# Patient Record
Sex: FEMALE | Race: WHITE | Employment: OTHER | URBAN - NONMETROPOLITAN AREA
[De-identification: names, ages, dates, MRNs, and addresses within clinical notes are randomized per-mention and may not be internally consistent; named-entity substitution may affect disease eponyms.]

---

## 2017-04-28 DIAGNOSIS — E03.4 HYPOTHYROIDISM DUE TO ACQUIRED ATROPHY OF THYROID: Primary | ICD-10-CM

## 2017-04-28 RX ORDER — LEVOTHYROXINE SODIUM 0.1 MG/1
100 TABLET ORAL DAILY
Qty: 30 TABLET | Refills: 0 | Status: SHIPPED | OUTPATIENT
Start: 2017-04-28 | End: 2017-06-01 | Stop reason: SDUPTHER

## 2017-06-01 ENCOUNTER — OFFICE VISIT (OUTPATIENT)
Dept: FAMILY MEDICINE CLINIC | Age: 72
End: 2017-06-01

## 2017-06-01 VITALS
SYSTOLIC BLOOD PRESSURE: 136 MMHG | WEIGHT: 147.6 LBS | HEIGHT: 63 IN | RESPIRATION RATE: 12 BRPM | DIASTOLIC BLOOD PRESSURE: 80 MMHG | HEART RATE: 92 BPM | BODY MASS INDEX: 26.15 KG/M2

## 2017-06-01 DIAGNOSIS — E03.4 HYPOTHYROIDISM DUE TO ACQUIRED ATROPHY OF THYROID: Primary | ICD-10-CM

## 2017-06-01 DIAGNOSIS — R53.83 FATIGUE, UNSPECIFIED TYPE: ICD-10-CM

## 2017-06-01 DIAGNOSIS — Z12.39 SCREENING FOR BREAST CANCER: ICD-10-CM

## 2017-06-01 DIAGNOSIS — E66.3 OVERWEIGHT (BMI 25.0-29.9): ICD-10-CM

## 2017-06-01 PROCEDURE — G8427 DOCREV CUR MEDS BY ELIG CLIN: HCPCS | Performed by: FAMILY MEDICINE

## 2017-06-01 PROCEDURE — G8420 CALC BMI NORM PARAMETERS: HCPCS | Performed by: FAMILY MEDICINE

## 2017-06-01 PROCEDURE — 1036F TOBACCO NON-USER: CPT | Performed by: FAMILY MEDICINE

## 2017-06-01 PROCEDURE — 1123F ACP DISCUSS/DSCN MKR DOCD: CPT | Performed by: FAMILY MEDICINE

## 2017-06-01 PROCEDURE — G8400 PT W/DXA NO RESULTS DOC: HCPCS | Performed by: FAMILY MEDICINE

## 2017-06-01 PROCEDURE — 99214 OFFICE O/P EST MOD 30 MIN: CPT | Performed by: FAMILY MEDICINE

## 2017-06-01 PROCEDURE — 3014F SCREEN MAMMO DOC REV: CPT | Performed by: FAMILY MEDICINE

## 2017-06-01 PROCEDURE — 1090F PRES/ABSN URINE INCON ASSESS: CPT | Performed by: FAMILY MEDICINE

## 2017-06-01 PROCEDURE — 4040F PNEUMOC VAC/ADMIN/RCVD: CPT | Performed by: FAMILY MEDICINE

## 2017-06-01 PROCEDURE — 3017F COLORECTAL CA SCREEN DOC REV: CPT | Performed by: FAMILY MEDICINE

## 2017-06-01 RX ORDER — LEVOTHYROXINE SODIUM 0.1 MG/1
100 TABLET ORAL DAILY
Qty: 90 TABLET | Refills: 3 | Status: SHIPPED | OUTPATIENT
Start: 2017-06-01 | End: 2018-07-17 | Stop reason: SDUPTHER

## 2017-06-01 ASSESSMENT — ENCOUNTER SYMPTOMS
CONSTIPATION: 0
SHORTNESS OF BREATH: 0
WHEEZING: 0
NAUSEA: 0
SORE THROAT: 0
COUGH: 0
CHEST TIGHTNESS: 0
EYE PAIN: 0
VOMITING: 0
DIARRHEA: 0
RHINORRHEA: 0
ABDOMINAL PAIN: 0
BLOOD IN STOOL: 0
BACK PAIN: 0

## 2017-06-01 ASSESSMENT — PATIENT HEALTH QUESTIONNAIRE - PHQ9
2. FEELING DOWN, DEPRESSED OR HOPELESS: 1
1. LITTLE INTEREST OR PLEASURE IN DOING THINGS: 0
SUM OF ALL RESPONSES TO PHQ QUESTIONS 1-9: 1
SUM OF ALL RESPONSES TO PHQ9 QUESTIONS 1 & 2: 1

## 2017-06-02 ENCOUNTER — TELEPHONE (OUTPATIENT)
Dept: FAMILY MEDICINE CLINIC | Age: 72
End: 2017-06-02

## 2017-06-02 LAB
ABSOLUTE BASO #: 0.1 K/UL (ref 0–0.1)
ABSOLUTE EOS #: 0.2 K/UL (ref 0.1–0.4)
ABSOLUTE LYMPH #: 1.7 K/UL (ref 0.8–5.2)
ABSOLUTE MONO #: 0.4 K/UL (ref 0.1–0.9)
ABSOLUTE NEUT #: 3.3 K/UL (ref 1.3–9.1)
BASOPHILS ABSOLUTE: NORMAL /ΜL
BASOPHILS RELATIVE PERCENT: 1.4 %
BASOPHILS RELATIVE PERCENT: NORMAL %
CHOLESTEROL, TOTAL: 232 MG/DL
CHOLESTEROL/HDL RATIO: 2.7
CHOLESTEROL/HDL RATIO: 2.7
CHOLESTEROL: 232 MG/DL
EOSINOPHILS ABSOLUTE: NORMAL /ΜL
EOSINOPHILS RELATIVE PERCENT: 2.7 %
EOSINOPHILS RELATIVE PERCENT: NORMAL %
GLUCOSE: 90 MG/DL (ref 70–100)
HCT VFR BLD CALC: 40.2 % (ref 36–48)
HCT VFR BLD CALC: NORMAL % (ref 36–46)
HDLC SERPL-MCNC: 85 MG/DL (ref 35–70)
HDLC SERPL-MCNC: 85 MG/DL (ref 40–60)
HEMOGLOBIN: 13.8 G/DL (ref 12–16)
HEMOGLOBIN: NORMAL G/DL (ref 12–16)
LDL CHOLESTEROL CALCULATED: 131 MG/DL
LDL CHOLESTEROL CALCULATED: 131 MG/DL (ref 0–160)
LDL/HDL RATIO: 1.5
LYMPHOCYTE %: 30.2 %
LYMPHOCYTES ABSOLUTE: NORMAL /ΜL
LYMPHOCYTES RELATIVE PERCENT: NORMAL %
MCH RBC QN AUTO: 31.2 PG (ref 27–34)
MCH RBC QN AUTO: NORMAL PG
MCHC RBC AUTO-ENTMCNC: 34.3 G/DL (ref 31–36)
MCHC RBC AUTO-ENTMCNC: NORMAL G/DL
MCV RBC AUTO: 90.7 FL (ref 80–100)
MCV RBC AUTO: NORMAL FL
MONOCYTES # BLD: 6.2 %
MONOCYTES ABSOLUTE: NORMAL /ΜL
MONOCYTES RELATIVE PERCENT: NORMAL %
NEUTROPHILS ABSOLUTE: NORMAL /ΜL
NEUTROPHILS RELATIVE PERCENT: 59.1 %
NEUTROPHILS RELATIVE PERCENT: NORMAL %
PDW BLD-RTO: 12.3 % (ref 10.8–14.8)
PLATELET # BLD: NORMAL K/ΜL
PLATELETS: 237 K/UL (ref 150–450)
PMV BLD AUTO: NORMAL FL
RBC # BLD: NORMAL 10^6/ΜL
RBC: 4.43 M/UL (ref 4–5.5)
T4 FREE: 1.67
T4 FREE: 1.67 NG/DL (ref 0.8–1.8)
TRIGL SERPL-MCNC: 82 MG/DL
TRIGL SERPL-MCNC: 82 MG/DL
TSH SERPL DL<=0.05 MIU/L-ACNC: 0.64 UIU/ML
TSH SERPL DL<=0.05 MIU/L-ACNC: 0.64 UIU/ML (ref 0.4–4.4)
VLDLC SERPL CALC-MCNC: 16 MG/DL
VLDLC SERPL CALC-MCNC: 16 MG/DL
WBC # BLD: NORMAL 10^3/ML
WBC: 5.6 K/UL (ref 3.7–10.8)

## 2017-11-08 ENCOUNTER — OFFICE VISIT (OUTPATIENT)
Dept: FAMILY MEDICINE CLINIC | Age: 72
End: 2017-11-08
Payer: MEDICARE

## 2017-11-08 VITALS
BODY MASS INDEX: 26.46 KG/M2 | RESPIRATION RATE: 12 BRPM | DIASTOLIC BLOOD PRESSURE: 74 MMHG | HEART RATE: 72 BPM | WEIGHT: 147 LBS | SYSTOLIC BLOOD PRESSURE: 112 MMHG

## 2017-11-08 DIAGNOSIS — R30.0 DYSURIA: ICD-10-CM

## 2017-11-08 DIAGNOSIS — R10.31 RIGHT LOWER QUADRANT ABDOMINAL PAIN: ICD-10-CM

## 2017-11-08 DIAGNOSIS — N30.01 ACUTE CYSTITIS WITH HEMATURIA: Primary | ICD-10-CM

## 2017-11-08 LAB
BILIRUBIN, POC: NORMAL
BLOOD URINE, POC: NORMAL
CLARITY, POC: CLEAR
COLOR, POC: YELLOW
GLUCOSE URINE, POC: NORMAL
KETONES, POC: NORMAL
LEUKOCYTE EST, POC: NORMAL
NITRITE, POC: NORMAL
PH, POC: 5
PROTEIN, POC: NORMAL
SPECIFIC GRAVITY, POC: 1.02
UROBILINOGEN, POC: 0.2

## 2017-11-08 PROCEDURE — 3017F COLORECTAL CA SCREEN DOC REV: CPT | Performed by: FAMILY MEDICINE

## 2017-11-08 PROCEDURE — G8427 DOCREV CUR MEDS BY ELIG CLIN: HCPCS | Performed by: FAMILY MEDICINE

## 2017-11-08 PROCEDURE — 99213 OFFICE O/P EST LOW 20 MIN: CPT | Performed by: FAMILY MEDICINE

## 2017-11-08 PROCEDURE — G8417 CALC BMI ABV UP PARAM F/U: HCPCS | Performed by: FAMILY MEDICINE

## 2017-11-08 PROCEDURE — 4040F PNEUMOC VAC/ADMIN/RCVD: CPT | Performed by: FAMILY MEDICINE

## 2017-11-08 PROCEDURE — G8484 FLU IMMUNIZE NO ADMIN: HCPCS | Performed by: FAMILY MEDICINE

## 2017-11-08 PROCEDURE — 1123F ACP DISCUSS/DSCN MKR DOCD: CPT | Performed by: FAMILY MEDICINE

## 2017-11-08 PROCEDURE — 1036F TOBACCO NON-USER: CPT | Performed by: FAMILY MEDICINE

## 2017-11-08 PROCEDURE — 1090F PRES/ABSN URINE INCON ASSESS: CPT | Performed by: FAMILY MEDICINE

## 2017-11-08 PROCEDURE — 3014F SCREEN MAMMO DOC REV: CPT | Performed by: FAMILY MEDICINE

## 2017-11-08 PROCEDURE — 81003 URINALYSIS AUTO W/O SCOPE: CPT | Performed by: FAMILY MEDICINE

## 2017-11-08 PROCEDURE — G8400 PT W/DXA NO RESULTS DOC: HCPCS | Performed by: FAMILY MEDICINE

## 2017-11-08 RX ORDER — SULFAMETHOXAZOLE AND TRIMETHOPRIM 800; 160 MG/1; MG/1
1 TABLET ORAL 2 TIMES DAILY
Qty: 20 TABLET | Refills: 0 | Status: SHIPPED | OUTPATIENT
Start: 2017-11-08 | End: 2017-11-18

## 2017-11-08 ASSESSMENT — ENCOUNTER SYMPTOMS
CONSTIPATION: 0
BACK PAIN: 0
WHEEZING: 0
SHORTNESS OF BREATH: 0
CHEST TIGHTNESS: 0
VOMITING: 0
DIARRHEA: 0
RHINORRHEA: 0
EYE PAIN: 0
SORE THROAT: 0
NAUSEA: 0
ABDOMINAL PAIN: 1
BLOOD IN STOOL: 0
COUGH: 0

## 2017-11-08 NOTE — PROGRESS NOTES
Subjective:      Patient ID: Lucas Ayoub is a 67 y.o. female. HPI  Pt here for 2 days of dysuria. Urine is positive. No fever or chills. Bactrim works well. Has area on her tongue to look at and abdominal pain from old appy scar. , nonsmoker,pmh reviewed. Reviewed BMI of 26,  Review of Systems   Constitutional: Negative for chills, fatigue, fever and unexpected weight change. HENT: Positive for mouth sores. Negative for congestion, ear pain, rhinorrhea and sore throat. Eyes: Negative for pain and visual disturbance. Respiratory: Negative for cough, chest tightness, shortness of breath and wheezing. Cardiovascular: Negative for chest pain and palpitations. Gastrointestinal: Positive for abdominal pain. Negative for blood in stool, constipation, diarrhea, nausea and vomiting. Genitourinary: Positive for dysuria. Negative for difficulty urinating, frequency, hematuria and urgency. Musculoskeletal: Negative for back pain, joint swelling, myalgias and neck pain. Skin: Negative for rash. Neurological: Negative for dizziness and headaches. Hematological: Negative for adenopathy. Does not bruise/bleed easily. Psychiatric/Behavioral: Negative for behavioral problems and sleep disturbance. The patient is not nervous/anxious. Objective:   Physical Exam   Constitutional: She is oriented to person, place, and time. She appears well-developed and well-nourished. HENT:   Head: Normocephalic and atraumatic. Right Ear: External ear normal.   Left Ear: External ear normal.   Nose: Nose normal.   Mouth/Throat: Oropharynx is clear and moist.   Eyes: EOM are normal. Pupils are equal, round, and reactive to light. Neck: Neck supple. No thyromegaly present. Cardiovascular: Normal rate, regular rhythm and normal heart sounds. Pulmonary/Chest: Breath sounds normal. She has no wheezes. She has no rales. Abdominal: Soft. Bowel sounds are normal. There is no tenderness.  There is

## 2017-11-08 NOTE — PATIENT INSTRUCTIONS
or treatment. The doctor has checked you carefully, but problems can develop later. If you notice any problems or new symptoms, get medical treatment right away. Follow-up care is a key part of your treatment and safety. Be sure to make and go to all appointments, and call your doctor if you are having problems. It's also a good idea to know your test results and keep a list of the medicines you take. How can you care for yourself at home? · Rest until you feel better. · To prevent dehydration, drink plenty of fluids, enough so that your urine is light yellow or clear like water. Choose water and other caffeine-free clear liquids until you feel better. If you have kidney, heart, or liver disease and have to limit fluids, talk with your doctor before you increase the amount of fluids you drink. · If your stomach is upset, eat mild foods, such as rice, dry toast or crackers, bananas, and applesauce. Try eating several small meals instead of two or three large ones. · Wait until 48 hours after all symptoms have gone away before you have spicy foods, alcohol, and drinks that contain caffeine. · Do not eat foods that are high in fat. · Avoid anti-inflammatory medicines such as aspirin, ibuprofen (Advil, Motrin), and naproxen (Aleve). These can cause stomach upset. Talk to your doctor if you take daily aspirin for another health problem. When should you call for help? Call 911 anytime you think you may need emergency care. For example, call if:  · You passed out (lost consciousness). · You pass maroon or very bloody stools. · You vomit blood or what looks like coffee grounds. · You have new, severe belly pain. Call your doctor now or seek immediate medical care if:  · Your pain gets worse, especially if it becomes focused in one area of your belly. · You have a new or higher fever. · Your stools are black and look like tar, or they have streaks of blood. · You have unexpected vaginal bleeding.   · You have symptoms of a urinary tract infection. These may include:  ¨ Pain when you urinate. ¨ Urinating more often than usual.  ¨ Blood in your urine. · You are dizzy or lightheaded, or you feel like you may faint. Watch closely for changes in your health, and be sure to contact your doctor if:  · You are not getting better after 1 day (24 hours). Where can you learn more? Go to https://[employer].ALOHA. org and sign in to your Ask Ziggy account. Enter M325 in the Fashism box to learn more about \"Abdominal Pain: Care Instructions. \"     If you do not have an account, please click on the \"Sign Up Now\" link. Current as of: March 20, 2017  Content Version: 11.3  © 1313-9556 [employer]. Care instructions adapted under license by [facility] ([facility]). If you have questions about a medical condition or this instruction, always ask your healthcare professional. [place] [address] any warranty or liability for your use of this information.

## 2017-11-10 ENCOUNTER — TELEPHONE (OUTPATIENT)
Dept: FAMILY MEDICINE CLINIC | Age: 72
End: 2017-11-10

## 2017-11-10 LAB — URINE CULTURE, ROUTINE: NORMAL

## 2018-03-16 ENCOUNTER — OFFICE VISIT (OUTPATIENT)
Dept: FAMILY MEDICINE CLINIC | Age: 73
End: 2018-03-16
Payer: MEDICARE

## 2018-03-16 VITALS
WEIGHT: 146.4 LBS | HEART RATE: 74 BPM | TEMPERATURE: 98 F | DIASTOLIC BLOOD PRESSURE: 84 MMHG | SYSTOLIC BLOOD PRESSURE: 130 MMHG | OXYGEN SATURATION: 97 % | BODY MASS INDEX: 25.94 KG/M2 | RESPIRATION RATE: 12 BRPM | HEIGHT: 63 IN

## 2018-03-16 DIAGNOSIS — H66.001 ACUTE SUPPURATIVE OTITIS MEDIA OF RIGHT EAR WITHOUT SPONTANEOUS RUPTURE OF TYMPANIC MEMBRANE, RECURRENCE NOT SPECIFIED: Primary | ICD-10-CM

## 2018-03-16 DIAGNOSIS — Z12.39 SCREENING FOR BREAST CANCER: ICD-10-CM

## 2018-03-16 PROCEDURE — 1090F PRES/ABSN URINE INCON ASSESS: CPT | Performed by: FAMILY MEDICINE

## 2018-03-16 PROCEDURE — 3014F SCREEN MAMMO DOC REV: CPT | Performed by: FAMILY MEDICINE

## 2018-03-16 PROCEDURE — 4040F PNEUMOC VAC/ADMIN/RCVD: CPT | Performed by: FAMILY MEDICINE

## 2018-03-16 PROCEDURE — G8427 DOCREV CUR MEDS BY ELIG CLIN: HCPCS | Performed by: FAMILY MEDICINE

## 2018-03-16 PROCEDURE — G8484 FLU IMMUNIZE NO ADMIN: HCPCS | Performed by: FAMILY MEDICINE

## 2018-03-16 PROCEDURE — G8400 PT W/DXA NO RESULTS DOC: HCPCS | Performed by: FAMILY MEDICINE

## 2018-03-16 PROCEDURE — G8417 CALC BMI ABV UP PARAM F/U: HCPCS | Performed by: FAMILY MEDICINE

## 2018-03-16 PROCEDURE — 1123F ACP DISCUSS/DSCN MKR DOCD: CPT | Performed by: FAMILY MEDICINE

## 2018-03-16 PROCEDURE — 1036F TOBACCO NON-USER: CPT | Performed by: FAMILY MEDICINE

## 2018-03-16 PROCEDURE — 99213 OFFICE O/P EST LOW 20 MIN: CPT | Performed by: FAMILY MEDICINE

## 2018-03-16 PROCEDURE — 3017F COLORECTAL CA SCREEN DOC REV: CPT | Performed by: FAMILY MEDICINE

## 2018-03-16 RX ORDER — SULFAMETHOXAZOLE AND TRIMETHOPRIM 800; 160 MG/1; MG/1
1 TABLET ORAL 2 TIMES DAILY
Qty: 20 TABLET | Refills: 0 | Status: SHIPPED | OUTPATIENT
Start: 2018-03-16 | End: 2018-03-26

## 2018-03-16 ASSESSMENT — ENCOUNTER SYMPTOMS
BLOOD IN STOOL: 0
WHEEZING: 0
EYE PAIN: 0
CHEST TIGHTNESS: 0
DIARRHEA: 0
RHINORRHEA: 0
ABDOMINAL PAIN: 0
SHORTNESS OF BREATH: 0
BACK PAIN: 0
CONSTIPATION: 0
VOMITING: 0
SORE THROAT: 0
NAUSEA: 0
COUGH: 0

## 2018-07-17 ENCOUNTER — OFFICE VISIT (OUTPATIENT)
Dept: FAMILY MEDICINE CLINIC | Age: 73
End: 2018-07-17
Payer: MEDICARE

## 2018-07-17 VITALS
WEIGHT: 148.2 LBS | SYSTOLIC BLOOD PRESSURE: 112 MMHG | BODY MASS INDEX: 27.27 KG/M2 | HEART RATE: 74 BPM | HEIGHT: 62 IN | DIASTOLIC BLOOD PRESSURE: 80 MMHG | RESPIRATION RATE: 14 BRPM

## 2018-07-17 DIAGNOSIS — E03.4 HYPOTHYROIDISM DUE TO ACQUIRED ATROPHY OF THYROID: Primary | ICD-10-CM

## 2018-07-17 DIAGNOSIS — Z12.39 SCREENING FOR BREAST CANCER: ICD-10-CM

## 2018-07-17 DIAGNOSIS — E66.3 OVERWEIGHT (BMI 25.0-29.9): ICD-10-CM

## 2018-07-17 DIAGNOSIS — E78.00 HYPERCHOLESTEROLEMIA: ICD-10-CM

## 2018-07-17 LAB
ALBUMIN SERPL-MCNC: 4.5 G/DL (ref 3.5–5.1)
ALP BLD-CCNC: 51 U/L (ref 38–126)
ALT SERPL-CCNC: 13 U/L (ref 11–66)
ANION GAP SERPL CALCULATED.3IONS-SCNC: 13 MEQ/L (ref 8–16)
AST SERPL-CCNC: 18 U/L (ref 5–40)
BASOPHILS # BLD: 1.1 %
BASOPHILS ABSOLUTE: 0.1 THOU/MM3 (ref 0–0.1)
BILIRUB SERPL-MCNC: 0.6 MG/DL (ref 0.3–1.2)
BUN BLDV-MCNC: 13 MG/DL (ref 7–22)
CALCIUM SERPL-MCNC: 9.8 MG/DL (ref 8.5–10.5)
CHLORIDE BLD-SCNC: 102 MEQ/L (ref 98–111)
CHOLESTEROL, TOTAL: 249 MG/DL (ref 100–199)
CO2: 26 MEQ/L (ref 23–33)
CREAT SERPL-MCNC: 0.6 MG/DL (ref 0.4–1.2)
EOSINOPHIL # BLD: 2.8 %
EOSINOPHILS ABSOLUTE: 0.2 THOU/MM3 (ref 0–0.4)
ERYTHROCYTE [DISTWIDTH] IN BLOOD BY AUTOMATED COUNT: 13.2 % (ref 11.5–14.5)
ERYTHROCYTE [DISTWIDTH] IN BLOOD BY AUTOMATED COUNT: 46.1 FL (ref 35–45)
GFR SERPL CREATININE-BSD FRML MDRD: > 90 ML/MIN/1.73M2
GLUCOSE BLD-MCNC: 92 MG/DL (ref 70–108)
HCT VFR BLD CALC: 40.7 % (ref 37–47)
HDLC SERPL-MCNC: 84 MG/DL
HEMOGLOBIN: 13.4 GM/DL (ref 12–16)
IMMATURE GRANS (ABS): 0.02 THOU/MM3 (ref 0–0.07)
IMMATURE GRANULOCYTES: 0.4 %
LDL CHOLESTEROL CALCULATED: 138 MG/DL
LYMPHOCYTES # BLD: 35.1 %
LYMPHOCYTES ABSOLUTE: 2 THOU/MM3 (ref 1–4.8)
MCH RBC QN AUTO: 31.3 PG (ref 26–33)
MCHC RBC AUTO-ENTMCNC: 32.9 GM/DL (ref 32.2–35.5)
MCV RBC AUTO: 95.1 FL (ref 81–99)
MONOCYTES # BLD: 6.9 %
MONOCYTES ABSOLUTE: 0.4 THOU/MM3 (ref 0.4–1.3)
NUCLEATED RED BLOOD CELLS: 0 /100 WBC
PLATELET # BLD: 233 THOU/MM3 (ref 130–400)
PMV BLD AUTO: 10.1 FL (ref 9.4–12.4)
POTASSIUM SERPL-SCNC: 4.2 MEQ/L (ref 3.5–5.2)
RBC # BLD: 4.28 MILL/MM3 (ref 4.2–5.4)
SEG NEUTROPHILS: 53.7 %
SEGMENTED NEUTROPHILS ABSOLUTE COUNT: 3.1 THOU/MM3 (ref 1.8–7.7)
SODIUM BLD-SCNC: 141 MEQ/L (ref 135–145)
T4 FREE: 1.29 NG/DL (ref 0.93–1.76)
TOTAL PROTEIN: 7.4 G/DL (ref 6.1–8)
TRIGL SERPL-MCNC: 133 MG/DL (ref 0–199)
TSH SERPL DL<=0.05 MIU/L-ACNC: 3.94 UIU/ML (ref 0.4–4.2)
WBC # BLD: 5.7 THOU/MM3 (ref 4.8–10.8)

## 2018-07-17 PROCEDURE — 1123F ACP DISCUSS/DSCN MKR DOCD: CPT | Performed by: FAMILY MEDICINE

## 2018-07-17 PROCEDURE — 1090F PRES/ABSN URINE INCON ASSESS: CPT | Performed by: FAMILY MEDICINE

## 2018-07-17 PROCEDURE — 1101F PT FALLS ASSESS-DOCD LE1/YR: CPT | Performed by: FAMILY MEDICINE

## 2018-07-17 PROCEDURE — 1036F TOBACCO NON-USER: CPT | Performed by: FAMILY MEDICINE

## 2018-07-17 PROCEDURE — G8400 PT W/DXA NO RESULTS DOC: HCPCS | Performed by: FAMILY MEDICINE

## 2018-07-17 PROCEDURE — G8417 CALC BMI ABV UP PARAM F/U: HCPCS | Performed by: FAMILY MEDICINE

## 2018-07-17 PROCEDURE — 4040F PNEUMOC VAC/ADMIN/RCVD: CPT | Performed by: FAMILY MEDICINE

## 2018-07-17 PROCEDURE — 99214 OFFICE O/P EST MOD 30 MIN: CPT | Performed by: FAMILY MEDICINE

## 2018-07-17 PROCEDURE — 36415 COLL VENOUS BLD VENIPUNCTURE: CPT | Performed by: FAMILY MEDICINE

## 2018-07-17 PROCEDURE — 3017F COLORECTAL CA SCREEN DOC REV: CPT | Performed by: FAMILY MEDICINE

## 2018-07-17 PROCEDURE — G8427 DOCREV CUR MEDS BY ELIG CLIN: HCPCS | Performed by: FAMILY MEDICINE

## 2018-07-17 RX ORDER — LEVOTHYROXINE SODIUM 0.1 MG/1
100 TABLET ORAL DAILY
Qty: 90 TABLET | Refills: 3 | Status: SHIPPED | OUTPATIENT
Start: 2018-07-17 | End: 2019-11-15 | Stop reason: SDUPTHER

## 2018-07-17 ASSESSMENT — PATIENT HEALTH QUESTIONNAIRE - PHQ9
2. FEELING DOWN, DEPRESSED OR HOPELESS: 0
SUM OF ALL RESPONSES TO PHQ QUESTIONS 1-9: 0
1. LITTLE INTEREST OR PLEASURE IN DOING THINGS: 0
SUM OF ALL RESPONSES TO PHQ9 QUESTIONS 1 & 2: 0

## 2018-07-17 ASSESSMENT — ENCOUNTER SYMPTOMS
SHORTNESS OF BREATH: 0
COUGH: 0
BACK PAIN: 0
VOMITING: 0
RHINORRHEA: 0
EYE PAIN: 0
SORE THROAT: 0
NAUSEA: 0
CONSTIPATION: 0
CHEST TIGHTNESS: 0
WHEEZING: 0
ABDOMINAL PAIN: 0
DIARRHEA: 0
BLOOD IN STOOL: 0

## 2018-07-17 NOTE — PATIENT INSTRUCTIONS
stroke. LDL and HDL are part of your total cholesterol. LDL is the \"bad\" cholesterol. High LDL can raise your risk for heart disease, heart attack, and stroke. HDL is the \"good\" cholesterol. It helps clear bad cholesterol from the body. High HDL is linked with a lower risk of heart disease, heart attack, and stroke. Your cholesterol levels help your doctor find out your risk for having a heart attack or stroke. You and your doctor can talk about whether you need to lower your risk and what treatment is best for you. A heart-healthy lifestyle along with medicines can help lower your cholesterol and your risk. The way you choose to lower your risk will depend on how high your risk is for heart attack and stroke. It will also depend on how you feel about taking medicines. Follow-up care is a key part of your treatment and safety. Be sure to make and go to all appointments, and call your doctor if you are having problems. It's also a good idea to know your test results and keep a list of the medicines you take. How can you care for yourself at home? · Eat a variety of foods every day. Good choices include fruits, vegetables, whole grains (like oatmeal), dried beans and peas, nuts and seeds, soy products (like tofu), and fat-free or low-fat dairy products. · Replace butter, margarine, and hydrogenated or partially hydrogenated oils with olive and canola oils. (Canola oil margarine without trans fat is fine.)  · Replace red meat with fish, poultry, and soy protein (like tofu). · Limit processed and packaged foods like chips, crackers, and cookies. · Bake, broil, or steam foods. Don't baltazar them. · Be physically active. Get at least 30 minutes of exercise on most days of the week. Walking is a good choice. You also may want to do other activities, such as running, swimming, cycling, or playing tennis or team sports.   · Stay at a healthy weight or lose weight by making the changes in eating and physical activity listed above. Losing just a small amount of weight, even 5 to 10 pounds, can reduce your risk for having a heart attack or stroke. · Do not smoke. When should you call for help? Watch closely for changes in your health, and be sure to contact your doctor if:    · You need help making lifestyle changes.     · You have questions about your medicine. Where can you learn more? Go to https://[employer]. org and sign in to your The Hitch account. Enter V427 in the Chairish box to learn more about \"High Cholesterol: Care Instructions. \"     If you do not have an account, please click on the \"Sign Up Now\" link. Current as of: May 10, 2017  Content Version: 11.6  © 4027-6713 [employer]. Care instructions adapted under license by [facility] ([facility]). If you have questions about a medical condition or this instruction, always ask your healthcare professional. [address] any warranty or liability for your use of this information. Patient Education        DASH Diet: Care Instructions  Your Care Instructions    The DASH diet is an eating plan that can help lower your blood pressure. DASH stands for Dietary Approaches to Stop Hypertension. Hypertension is high blood pressure. The DASH diet focuses on eating foods that are high in calcium, potassium, and magnesium. These nutrients can lower blood pressure. The foods that are highest in these nutrients are fruits, vegetables, low-fat dairy products, nuts, seeds, and legumes. But taking calcium, potassium, and magnesium supplements instead of eating foods that are high in those nutrients does not have the same effect. The DASH diet also includes whole grains, fish, and poultry. The DASH diet is one of several lifestyle changes your doctor may recommend to lower your high blood pressure. Your doctor may also want you to decrease the amount of sodium in your diet.  Lowering sodium while following the DASH diet can lower blood pressure even further than just the DASH diet alone. Follow-up care is a key part of your treatment and safety. Be sure to make and go to all appointments, and call your doctor if you are having problems. It's also a good idea to know your test results and keep a list of the medicines you take. How can you care for yourself at home? Following the DASH diet  · Eat 4 to 5 servings of fruit each day. A serving is 1 medium-sized piece of fruit, ½ cup chopped or canned fruit, 1/4 cup dried fruit, or 4 ounces (½ cup) of fruit juice. Choose fruit more often than fruit juice. · Eat 4 to 5 servings of vegetables each day. A serving is 1 cup of lettuce or raw leafy vegetables, ½ cup of chopped or cooked vegetables, or 4 ounces (½ cup) of vegetable juice. Choose vegetables more often than vegetable juice. · Get 2 to 3 servings of low-fat and fat-free dairy each day. A serving is 8 ounces of milk, 1 cup of yogurt, or 1 ½ ounces of cheese. · Eat 6 to 8 servings of grains each day. A serving is 1 slice of bread, 1 ounce of dry cereal, or ½ cup of cooked rice, pasta, or cooked cereal. Try to choose whole-grain products as much as possible. · Limit lean meat, poultry, and fish to 2 servings each day. A serving is 3 ounces, about the size of a deck of cards. · Eat 4 to 5 servings of nuts, seeds, and legumes (cooked dried beans, lentils, and split peas) each week. A serving is 1/3 cup of nuts, 2 tablespoons of seeds, or ½ cup of cooked beans or peas. · Limit fats and oils to 2 to 3 servings each day. A serving is 1 teaspoon of vegetable oil or 2 tablespoons of salad dressing. · Limit sweets and added sugars to 5 servings or less a week. A serving is 1 tablespoon jelly or jam, ½ cup sorbet, or 1 cup of lemonade. · Eat less than 2,300 milligrams (mg) of sodium a day. If you limit your sodium to 1,500 mg a day, you can lower your blood pressure even more. Tips for success  · Start small.  Do not try to make

## 2018-07-17 NOTE — PROGRESS NOTES
Musculoskeletal: Normal range of motion. She exhibits no edema. Lymphadenopathy:     She has no cervical adenopathy. Neurological: She is alert and oriented to person, place, and time. She has normal reflexes. No cranial nerve deficit. Skin: Skin is warm and dry. No rash noted. Psychiatric: She has a normal mood and affect. Nursing note and vitals reviewed. Health Maintenance   Topic Date Due    Hepatitis C screen  1945    DTaP/Tdap/Td vaccine (1 - Tdap) 09/18/1964    Shingles Vaccine (1 of 2 - 2 Dose Series) 09/18/1995    Pneumococcal low/med risk (1 of 2 - PCV13) 09/18/2010    A1C test (Diabetic or Prediabetic)  12/03/2013    Breast cancer screen  04/19/2018    Flu vaccine (1) 09/01/2018    TSH testing  12/18/2018    Colon cancer screen colonoscopy  06/10/2020    Lipid screen  06/01/2022    DEXA (modify frequency per FRAX score)  Addressed     Lab Results   Component Value Date    CHOL 232 (H) 06/01/2017    CHOL 232 06/01/2017    CHOL 224 (H) 06/17/2016     Lab Results   Component Value Date    TRIG 82 06/01/2017    TRIG 82 06/01/2017    TRIG 74 06/17/2016     Lab Results   Component Value Date    HDL 85 (H) 06/01/2017    HDL 85 (A) 06/01/2017    HDL 77 (H) 06/17/2016     Lab Results   Component Value Date    LDLCALC 131 (H) 06/01/2017    LDLCALC 131 06/01/2017    LDLCALC 132 (H) 06/17/2016     Lab Results   Component Value Date    LABVLDL 16 06/01/2017    LABVLDL 15 06/17/2016    VLDL 16 06/01/2017    VLDL 27 08/08/2014     Lab Results   Component Value Date    CHOLHDLRATIO 2.7 06/01/2017    CHOLHDLRATIO 2.7 06/01/2017    CHOLHDLRATIO 2.9 06/17/2016     Lab Results   Component Value Date    TSH 0.635 06/01/2017       Assessment:      Hypothyroidism  Hypercholesterolemia  Overweight      Plan:      Refill meds  Blood work  Encouraged diet, exercise and weight loss.   Dash diet  Reviewed health maintenance  Mammogram    Luciana Ramirez received counseling on the following healthy behaviors:

## 2018-07-18 ENCOUNTER — TELEPHONE (OUTPATIENT)
Dept: FAMILY MEDICINE CLINIC | Age: 73
End: 2018-07-18

## 2018-07-18 NOTE — TELEPHONE ENCOUNTER
----- Message from Vanna Loyd MD sent at 7/18/2018  7:17 AM EDT -----  Thyroids are good. Cholesterol at 249 but still normal CRR. ( 232 last year). CBC and CMP are good. Continue present.

## 2019-07-05 ENCOUNTER — TELEPHONE (OUTPATIENT)
Dept: FAMILY MEDICINE CLINIC | Age: 74
End: 2019-07-05

## 2019-11-13 ENCOUNTER — TELEPHONE (OUTPATIENT)
Dept: FAMILY MEDICINE CLINIC | Age: 74
End: 2019-11-13

## 2019-11-15 ENCOUNTER — OFFICE VISIT (OUTPATIENT)
Dept: FAMILY MEDICINE CLINIC | Age: 74
End: 2019-11-15
Payer: MEDICARE

## 2019-11-15 VITALS
SYSTOLIC BLOOD PRESSURE: 116 MMHG | TEMPERATURE: 97.5 F | RESPIRATION RATE: 12 BRPM | HEART RATE: 72 BPM | DIASTOLIC BLOOD PRESSURE: 68 MMHG

## 2019-11-15 DIAGNOSIS — E78.00 HYPERCHOLESTEROLEMIA: ICD-10-CM

## 2019-11-15 DIAGNOSIS — E03.4 HYPOTHYROIDISM DUE TO ACQUIRED ATROPHY OF THYROID: ICD-10-CM

## 2019-11-15 DIAGNOSIS — F32.9 REACTIVE DEPRESSION: ICD-10-CM

## 2019-11-15 DIAGNOSIS — K21.9 GASTROESOPHAGEAL REFLUX DISEASE, ESOPHAGITIS PRESENCE NOT SPECIFIED: ICD-10-CM

## 2019-11-15 DIAGNOSIS — I63.9 CEREBROVASCULAR ACCIDENT (CVA), UNSPECIFIED MECHANISM (HCC): Primary | ICD-10-CM

## 2019-11-15 DIAGNOSIS — H66.001 NON-RECURRENT ACUTE SUPPURATIVE OTITIS MEDIA OF RIGHT EAR WITHOUT SPONTANEOUS RUPTURE OF TYMPANIC MEMBRANE: ICD-10-CM

## 2019-11-15 PROBLEM — R29.898 RIGHT ARM WEAKNESS: Status: ACTIVE | Noted: 2019-07-07

## 2019-11-15 PROBLEM — R47.01 EXPRESSIVE APHASIA: Status: ACTIVE | Noted: 2019-07-07

## 2019-11-15 PROBLEM — I63.00 CEREBROVASCULAR ACCIDENT (CVA) DUE TO THROMBOSIS OF PRECEREBRAL ARTERY (HCC): Status: ACTIVE | Noted: 2019-07-03

## 2019-11-15 PROBLEM — R29.898 RIGHT LEG WEAKNESS: Status: ACTIVE | Noted: 2019-07-07

## 2019-11-15 LAB
ALBUMIN SERPL-MCNC: 4.5 G/DL (ref 3.5–5.1)
ALP BLD-CCNC: 55 U/L (ref 38–126)
ALT SERPL-CCNC: 11 U/L (ref 11–66)
ANION GAP SERPL CALCULATED.3IONS-SCNC: 14 MEQ/L (ref 8–16)
AST SERPL-CCNC: 14 U/L (ref 5–40)
BASOPHILS # BLD: 1 %
BASOPHILS ABSOLUTE: 0.1 THOU/MM3 (ref 0–0.1)
BILIRUB SERPL-MCNC: 0.7 MG/DL (ref 0.3–1.2)
BUN BLDV-MCNC: 12 MG/DL (ref 7–22)
CALCIUM SERPL-MCNC: 9.9 MG/DL (ref 8.5–10.5)
CHLORIDE BLD-SCNC: 98 MEQ/L (ref 98–111)
CHOLESTEROL, TOTAL: 143 MG/DL (ref 100–199)
CO2: 26 MEQ/L (ref 23–33)
CREAT SERPL-MCNC: 0.5 MG/DL (ref 0.4–1.2)
EOSINOPHIL # BLD: 1.6 %
EOSINOPHILS ABSOLUTE: 0.1 THOU/MM3 (ref 0–0.4)
ERYTHROCYTE [DISTWIDTH] IN BLOOD BY AUTOMATED COUNT: 12.9 % (ref 11.5–14.5)
ERYTHROCYTE [DISTWIDTH] IN BLOOD BY AUTOMATED COUNT: 48 FL (ref 35–45)
GFR SERPL CREATININE-BSD FRML MDRD: > 90 ML/MIN/1.73M2
GLUCOSE BLD-MCNC: 114 MG/DL (ref 70–108)
HCT VFR BLD CALC: 39.2 % (ref 37–47)
HDLC SERPL-MCNC: 76 MG/DL
HEMOGLOBIN: 12.5 GM/DL (ref 12–16)
IMMATURE GRANS (ABS): 0.01 THOU/MM3 (ref 0–0.07)
IMMATURE GRANULOCYTES: 0.2 %
LDL CHOLESTEROL CALCULATED: 52 MG/DL
LYMPHOCYTES # BLD: 25.7 %
LYMPHOCYTES ABSOLUTE: 1.6 THOU/MM3 (ref 1–4.8)
MCH RBC QN AUTO: 32.2 PG (ref 26–33)
MCHC RBC AUTO-ENTMCNC: 31.9 GM/DL (ref 32.2–35.5)
MCV RBC AUTO: 101 FL (ref 81–99)
MONOCYTES # BLD: 7.1 %
MONOCYTES ABSOLUTE: 0.4 THOU/MM3 (ref 0.4–1.3)
NUCLEATED RED BLOOD CELLS: 0 /100 WBC
PLATELET # BLD: 265 THOU/MM3 (ref 130–400)
PMV BLD AUTO: 9.6 FL (ref 9.4–12.4)
POTASSIUM SERPL-SCNC: 3.5 MEQ/L (ref 3.5–5.2)
RBC # BLD: 3.88 MILL/MM3 (ref 4.2–5.4)
SEG NEUTROPHILS: 64.4 %
SEGMENTED NEUTROPHILS ABSOLUTE COUNT: 4 THOU/MM3 (ref 1.8–7.7)
SODIUM BLD-SCNC: 138 MEQ/L (ref 135–145)
T4 FREE: 1.65 NG/DL (ref 0.93–1.76)
TOTAL PROTEIN: 6.8 G/DL (ref 6.1–8)
TRIGL SERPL-MCNC: 73 MG/DL (ref 0–199)
TSH SERPL DL<=0.05 MIU/L-ACNC: 4.3 UIU/ML (ref 0.4–4.2)
WBC # BLD: 6.2 THOU/MM3 (ref 4.8–10.8)

## 2019-11-15 PROCEDURE — G8484 FLU IMMUNIZE NO ADMIN: HCPCS | Performed by: FAMILY MEDICINE

## 2019-11-15 PROCEDURE — 3017F COLORECTAL CA SCREEN DOC REV: CPT | Performed by: FAMILY MEDICINE

## 2019-11-15 PROCEDURE — G8599 NO ASA/ANTIPLAT THER USE RNG: HCPCS | Performed by: FAMILY MEDICINE

## 2019-11-15 PROCEDURE — 99214 OFFICE O/P EST MOD 30 MIN: CPT | Performed by: FAMILY MEDICINE

## 2019-11-15 PROCEDURE — G8400 PT W/DXA NO RESULTS DOC: HCPCS | Performed by: FAMILY MEDICINE

## 2019-11-15 PROCEDURE — 1090F PRES/ABSN URINE INCON ASSESS: CPT | Performed by: FAMILY MEDICINE

## 2019-11-15 PROCEDURE — 1036F TOBACCO NON-USER: CPT | Performed by: FAMILY MEDICINE

## 2019-11-15 PROCEDURE — G8427 DOCREV CUR MEDS BY ELIG CLIN: HCPCS | Performed by: FAMILY MEDICINE

## 2019-11-15 PROCEDURE — 4040F PNEUMOC VAC/ADMIN/RCVD: CPT | Performed by: FAMILY MEDICINE

## 2019-11-15 PROCEDURE — 1123F ACP DISCUSS/DSCN MKR DOCD: CPT | Performed by: FAMILY MEDICINE

## 2019-11-15 PROCEDURE — 1111F DSCHRG MED/CURRENT MED MERGE: CPT | Performed by: FAMILY MEDICINE

## 2019-11-15 PROCEDURE — G8421 BMI NOT CALCULATED: HCPCS | Performed by: FAMILY MEDICINE

## 2019-11-15 RX ORDER — ATORVASTATIN CALCIUM 80 MG/1
80 TABLET, FILM COATED ORAL DAILY
COMMUNITY
End: 2019-11-15 | Stop reason: SDUPTHER

## 2019-11-15 RX ORDER — ATORVASTATIN CALCIUM 80 MG/1
80 TABLET, FILM COATED ORAL DAILY
Qty: 90 TABLET | Refills: 3 | Status: SHIPPED | OUTPATIENT
Start: 2019-11-15 | End: 2020-04-24 | Stop reason: ALTCHOICE

## 2019-11-15 RX ORDER — CIPROFLOXACIN 500 MG/1
500 TABLET, FILM COATED ORAL 2 TIMES DAILY
Qty: 20 TABLET | Refills: 0 | Status: SHIPPED | OUTPATIENT
Start: 2019-11-15 | End: 2019-11-25

## 2019-11-15 RX ORDER — ARM BRACE
EACH MISCELLANEOUS
Qty: 1 EACH | Refills: 0 | Status: ON HOLD | OUTPATIENT
Start: 2019-11-15 | End: 2020-04-03 | Stop reason: HOSPADM

## 2019-11-15 RX ORDER — BACLOFEN 10 MG/1
10 TABLET ORAL 3 TIMES DAILY
COMMUNITY
End: 2020-04-24 | Stop reason: ALTCHOICE

## 2019-11-15 RX ORDER — RANITIDINE 150 MG/1
150 TABLET ORAL 2 TIMES DAILY
COMMUNITY
End: 2019-11-15 | Stop reason: SDUPTHER

## 2019-11-15 RX ORDER — LEVOTHYROXINE SODIUM 0.1 MG/1
100 TABLET ORAL DAILY
Qty: 90 TABLET | Refills: 3 | Status: SHIPPED | OUTPATIENT
Start: 2019-11-15

## 2019-11-15 RX ORDER — CITALOPRAM 20 MG/1
20 TABLET ORAL DAILY
Qty: 90 TABLET | Refills: 3 | Status: ON HOLD | OUTPATIENT
Start: 2019-11-15 | End: 2020-09-14 | Stop reason: HOSPADM

## 2019-11-15 RX ORDER — CITALOPRAM 10 MG/1
10 TABLET ORAL DAILY
COMMUNITY
End: 2019-11-15 | Stop reason: DRUGHIGH

## 2019-11-15 RX ORDER — RANITIDINE 150 MG/1
150 TABLET ORAL 2 TIMES DAILY
Qty: 180 TABLET | Refills: 3 | Status: SHIPPED | OUTPATIENT
Start: 2019-11-15

## 2019-11-15 ASSESSMENT — ENCOUNTER SYMPTOMS
VOMITING: 0
SHORTNESS OF BREATH: 0
CHEST TIGHTNESS: 0
NAUSEA: 0
ABDOMINAL PAIN: 0
COUGH: 0
DIARRHEA: 0
SORE THROAT: 0
BACK PAIN: 0
CONSTIPATION: 0
WHEEZING: 0
BLOOD IN STOOL: 0
RHINORRHEA: 0
EYE PAIN: 0

## 2019-11-15 ASSESSMENT — PATIENT HEALTH QUESTIONNAIRE - PHQ9: DEPRESSION UNABLE TO ASSESS: PT REFUSES

## 2019-11-18 ENCOUNTER — TELEPHONE (OUTPATIENT)
Dept: FAMILY MEDICINE CLINIC | Age: 74
End: 2019-11-18

## 2020-01-23 ENCOUNTER — TELEPHONE (OUTPATIENT)
Dept: FAMILY MEDICINE CLINIC | Age: 75
End: 2020-01-23

## 2020-03-25 ENCOUNTER — HOSPITAL ENCOUNTER (EMERGENCY)
Age: 75
Discharge: PSYCHIATRIC OR SUBSTANCE USE FACILITY | End: 2020-03-26
Attending: EMERGENCY MEDICINE
Payer: MEDICARE

## 2020-03-25 VITALS
WEIGHT: 153 LBS | BODY MASS INDEX: 28.16 KG/M2 | HEIGHT: 62 IN | DIASTOLIC BLOOD PRESSURE: 76 MMHG | OXYGEN SATURATION: 98 % | SYSTOLIC BLOOD PRESSURE: 116 MMHG | TEMPERATURE: 98.4 F | HEART RATE: 88 BPM | RESPIRATION RATE: 18 BRPM

## 2020-03-25 LAB
ACETAMINOPHEN LEVEL: <5 UG/ML (ref 15–30)
ALBUMIN SERPL-MCNC: 4.3 GM/DL (ref 3.4–5)
ALCOHOL SCREEN SERUM: NORMAL %WT/VOL
ALP BLD-CCNC: 55 IU/L (ref 40–129)
ALT SERPL-CCNC: 12 U/L (ref 10–40)
AMPHETAMINES: NEGATIVE
ANION GAP SERPL CALCULATED.3IONS-SCNC: 9 MMOL/L (ref 4–16)
AST SERPL-CCNC: 15 IU/L (ref 15–37)
BACTERIA: ABNORMAL /HPF
BARBITURATE SCREEN URINE: NEGATIVE
BENZODIAZEPINE SCREEN, URINE: NEGATIVE
BILIRUB SERPL-MCNC: 0.4 MG/DL (ref 0–1)
BILIRUBIN URINE: NEGATIVE MG/DL
BLOOD, URINE: NEGATIVE
BUN BLDV-MCNC: 14 MG/DL (ref 6–23)
CALCIUM SERPL-MCNC: 9.4 MG/DL (ref 8.3–10.6)
CANNABINOID SCREEN URINE: NEGATIVE
CHLORIDE BLD-SCNC: 103 MMOL/L (ref 99–110)
CLARITY: CLEAR
CO2: 28 MMOL/L (ref 21–32)
COCAINE METABOLITE: NEGATIVE
COLOR: YELLOW
CREAT SERPL-MCNC: 0.6 MG/DL (ref 0.6–1.1)
DOSE AMOUNT: ABNORMAL
DOSE AMOUNT: ABNORMAL
DOSE TIME: ABNORMAL
DOSE TIME: ABNORMAL
GFR AFRICAN AMERICAN: >60 ML/MIN/1.73M2
GFR NON-AFRICAN AMERICAN: >60 ML/MIN/1.73M2
GLUCOSE BLD-MCNC: 119 MG/DL (ref 70–99)
GLUCOSE, URINE: NEGATIVE MG/DL
HCT VFR BLD CALC: 40 % (ref 37–47)
HEMOGLOBIN: 12.8 GM/DL (ref 12.5–16)
KETONES, URINE: NEGATIVE MG/DL
LEUKOCYTE ESTERASE, URINE: ABNORMAL
MCH RBC QN AUTO: 31.8 PG (ref 27–31)
MCHC RBC AUTO-ENTMCNC: 32 % (ref 32–36)
MCV RBC AUTO: 99.5 FL (ref 78–100)
MUCUS: ABNORMAL HPF
NITRITE URINE, QUANTITATIVE: NEGATIVE
OPIATES, URINE: NEGATIVE
OXYCODONE: NORMAL
PDW BLD-RTO: 12.6 % (ref 11.7–14.9)
PH, URINE: 5 (ref 5–8)
PHENCYCLIDINE, URINE: NEGATIVE
PLATELET # BLD: 236 K/CU MM (ref 140–440)
PMV BLD AUTO: 8.8 FL (ref 7.5–11.1)
POTASSIUM SERPL-SCNC: 4 MMOL/L (ref 3.5–5.1)
PROTEIN UA: NEGATIVE MG/DL
RBC # BLD: 4.02 M/CU MM (ref 4.2–5.4)
RBC URINE: ABNORMAL /HPF (ref 0–6)
SALICYLATE LEVEL: <0.3 MG/DL (ref 15–30)
SODIUM BLD-SCNC: 140 MMOL/L (ref 135–145)
SPECIFIC GRAVITY UA: 1.02 (ref 1–1.03)
SQUAMOUS EPITHELIAL: 1 /HPF
TOTAL PROTEIN: 6.8 GM/DL (ref 6.4–8.2)
TRICHOMONAS: ABNORMAL /HPF
UROBILINOGEN, URINE: NORMAL MG/DL (ref 0.2–1)
WBC # BLD: 6.9 K/CU MM (ref 4–10.5)
WBC UA: 4 /HPF (ref 0–5)

## 2020-03-25 PROCEDURE — G0480 DRUG TEST DEF 1-7 CLASSES: HCPCS

## 2020-03-25 PROCEDURE — 85027 COMPLETE CBC AUTOMATED: CPT

## 2020-03-25 PROCEDURE — 80307 DRUG TEST PRSMV CHEM ANLYZR: CPT

## 2020-03-25 PROCEDURE — 36415 COLL VENOUS BLD VENIPUNCTURE: CPT

## 2020-03-25 PROCEDURE — 81001 URINALYSIS AUTO W/SCOPE: CPT

## 2020-03-25 PROCEDURE — 99285 EMERGENCY DEPT VISIT HI MDM: CPT

## 2020-03-25 PROCEDURE — 80053 COMPREHEN METABOLIC PANEL: CPT

## 2020-03-25 NOTE — PROGRESS NOTES
Daughter leaving and taking belongings and wheelchair home with her. Maris Mena.  PCT   03/25/2020 @ [phone]

## 2020-03-25 NOTE — ED PROVIDER NOTES
Triage Chief Complaint:   Aggressive Behavior (intermittent since December) and Depression (since CVA in July)      [place]:  Eladio Souza is a 76 y.o. female that presents depression, suicidal gestures and aggressive behavior. She had had a knife in her nightstand and tells us that she was planning on cutting her neck but was able to do so due to her poor coordination due to previous stroke. She has multiple outburst and is gone through multiple caregivers over the last few months. Family does not feel that they are able to take care of her safely due to her aggressive behavior and impulsivity. ROS:  General:  No fevers  Eyes:  No recent vison changes  ENT:  No sore throat, no nasal congestion  Cardiovascular:  No chest pain, no palpitations  Respiratory:  No shortness of breath  Gastrointestinal:  No pain, no nausea, no vomiting, no diarrhea  Musculoskeletal:  No muscle pain  Skin:  No rash  Neurologic:  no headache  Psychiatric:  No anxiety, + depression, +suicidal ideation  Genitourinary:  No dysuria  Endocrine:  No unexpected weight gain, no unexpected weight loss  Extremities:  no edema, no pain    Past Medical History:   Diagnosis Date    Allergic rhinitis     Contact dermatitis     hands    Contact dermatitis     b/l hands    Hypothyroidism     Pure hypercholesterolemia      Past Surgical History:   Procedure Laterality Date    APPENDECTOMY      age 15   [facility] APP[facility]DECTOMY      COLONOSCOPY      TONSILLECTOMY AND ADENOIDECTOMY      age 6   [facility] TONSILLECTOMY AND ADENOIDECTOMY       Family History   Problem Relation Age of Onset    Diabetes Father     Stroke Father     Diabetes Brother     Stroke Maternal Grandmother     Heart Disease Maternal Grandfather     Kidney Disease Mother      Social History     Socioeconomic History    Marital status:       Spouse name: Not on file    Number of children: Not on file    Years of education: Not on file    Highest education level: Not on file tablet 3    citalopram (CELEXA) 20 MG tablet Take 1 tablet by mouth daily 90 tablet 3    Elastic Bandages & Supports (ACE ANKLE BRACE) MISC Request AFO brace for right ankle 1 each 0     Allergies   Allergen Reactions    Pcn [Penicillins]     Sulfa Antibiotics     Penicillins Rash       Nursing Notes Reviewed    Physical Exam:  ED Triage Vitals [03/25/20 1517]   Enc Vitals Group      /74      Pulse 91      Resp 17      Temp 98.4 °F (36.9 °C)      Temp Source Oral      SpO2 97 %      Weight 153 lb (69.4 kg)      Height 5' 2\" (1.575 m)      Head Circumference       Peak Flow       Pain Score       Pain Loc       Pain Edu? Excl. in [address]? General appearance:  No acute distress. Skin:  Warm. Dry. Eye:  Extraocular movements intact. Ears, nose, mouth and throat:  Oral mucosa moist   Neck:  Trachea midline. Extremity: Normal ROM     Heart:  Regular  Perfusion:  intact  Respiratory:   Respirations nonlabored. Abdominal:  Non distended. Neurological:  Alert and oriented times 3. No focal neuro deficits.              Psychiatric:  Flat, + depression, + suicidal ideations, no homicidal ideations    I have reviewed and interpreted all of the currently available lab results from this visit (if applicable):  Results for orders placed or performed during the hospital encounter of 03/25/20   Urine Drug Screen   Result Value Ref Range    Cannabinoid Scrn, Ur NEGATIVE NEGATIVE    Amphetamines NEGATIVE NEGATIVE    Cocaine Metabolite NEGATIVE NEGATIVE    Benzodiazepine Screen, Urine NEGATIVE NEGATIVE    Barbiturate Screen, Ur NEGATIVE NEGATIVE    Opiates, Urine NEGATIVE NEGATIVE    Phencyclidine, Urine NEGATIVE NEGATIVE    Oxycodone  NEGATIVE     NEGATIVE          THRESHOLD CONCENTRATIONS (mg/dL)  AMPHT               1000  YESSI,OPIA             300  BZO,BAR              200  PCP                   25  THC                   50  OXY                  100          IF POSITIVE, SPECIMEN WILL BE  DISCARDED AFTER 6 MONTHS. CALL LAB IF CONFIRMATION NEEDED. ALL NEGATIVE SPECIMENS WILL BE  DISCARDED AFTER ONE WEEK. * UNCONFIRMED POSITIVES MAY  NOT MEET FORENSIC REQUIREMENTS. Salicylate   Result Value Ref Range    Salicylate Lvl <7.7 (L) 15 - 30 MG/DL    DOSE AMOUNT DOSE AMT. GIVEN - UNKNOWN     DOSE TIME DOSE TIME GIVEN - UNKNOWN    Ethanol   Result Value Ref Range    Alcohol Scrn <0.01  THE VALUE IS BELOW OUR DETECTION LIMIT. <0.01 %WT/VOL   Acetaminophen Level   Result Value Ref Range    Acetaminophen Level <5.0 (L) 15 - 30 ug/ml    DOSE AMOUNT DOSE AMT.  GIVEN - UNKNOWN     DOSE TIME DOSE TIME GIVEN - UNKNOWN    CBC   Result Value Ref Range    WBC 6.9 4.0 - 10.5 K/CU MM    RBC 4.02 (L) 4.2 - 5.4 M/CU MM    Hemoglobin 12.8 12.5 - 16.0 GM/DL    Hematocrit 40.0 37 - 47 %    MCV 99.5 78 - 100 FL    MCH 31.8 (H) 27 - 31 PG    MCHC 32.0 32.0 - 36.0 %    RDW 12.6 11.7 - 14.9 %    Platelets 924 226 - 283 K/CU MM    MPV 8.8 7.5 - 11.1 FL   Comprehensive Metabolic Panel w/ Reflex to MG   Result Value Ref Range    Sodium 140 135 - 145 MMOL/L    Potassium 4.0 3.5 - 5.1 MMOL/L    Chloride 103 99 - 110 mMol/L    CO2 28 21 - 32 MMOL/L    BUN 14 6 - 23 MG/DL    CREATININE 0.6 0.6 - 1.1 MG/DL    Glucose 119 (H) 70 - 99 MG/DL    Calcium 9.4 8.3 - 10.6 MG/DL    Alb 4.3 3.4 - 5.0 GM/DL    Total Protein 6.8 6.4 - 8.2 GM/DL    Total Bilirubin 0.4 0.0 - 1.0 MG/DL    ALT 12 10 - 40 U/L    AST 15 15 - 37 IU/L    Alkaline Phosphatase 55 40 - 129 IU/L    GFR Non-African American >60 >60 mL/min/1.73m2    GFR African American >60 >60 mL/min/1.73m2    Anion Gap 9 4 - 16   Urinalysis   Result Value Ref Range    Color, UA YELLOW YELLOW    Clarity, UA CLEAR CLEAR    Glucose, Urine NEGATIVE NEGATIVE MG/DL    Bilirubin Urine NEGATIVE NEGATIVE MG/DL    Ketones, Urine NEGATIVE NEGATIVE MG/DL    Specific Gravity, UA 1.019 1.001 - 1.035    Blood, Urine NEGATIVE NEGATIVE    pH, Urine 5.0 5.0 - 8.0    Protein, UA NEGATIVE Disposition referral (if applicable):  No follow-up provider specified. Disposition medications (if applicable):  New Prescriptions    No medications on file       Comment: Please note this report has been produced using speech recognition software and may contain errors related to that system including errors in grammar, punctuation, and spelling, as well as words and phrases that may be inappropriate. If there are any questions or concerns please feel free to contact the dictating provider for clarification.         Rosy Barrera MD  03/25/20 6708

## 2020-03-26 ENCOUNTER — HOSPITAL ENCOUNTER (INPATIENT)
Age: 75
LOS: 8 days | Discharge: HOME | DRG: 885 | End: 2020-04-03
Attending: PSYCHIATRY & NEUROLOGY | Admitting: PSYCHIATRY & NEUROLOGY
Payer: MEDICARE

## 2020-03-26 PROBLEM — F33.2 SEVERE RECURRENT MAJOR DEPRESSION WITHOUT PSYCHOTIC FEATURES (HCC): Chronic | Status: ACTIVE | Noted: 2020-03-26

## 2020-03-26 PROBLEM — R45.850 HOMICIDAL IDEATION: Chronic | Status: ACTIVE | Noted: 2020-03-26

## 2020-03-26 PROBLEM — R45.851 SUICIDAL IDEATION: Chronic | Status: ACTIVE | Noted: 2020-03-26

## 2020-03-26 PROBLEM — F32.2 MAJOR DEPRESSIVE DISORDER, SEVERE (HCC): Status: ACTIVE | Noted: 2020-03-26

## 2020-03-26 PROBLEM — R46.89 AGGRESSIVE BEHAVIOR: Chronic | Status: ACTIVE | Noted: 2020-03-26

## 2020-03-26 PROCEDURE — 1240000000 HC EMOTIONAL WELLNESS R&B

## 2020-03-26 PROCEDURE — 6370000000 HC RX 637 (ALT 250 FOR IP): Performed by: NURSE PRACTITIONER

## 2020-03-26 PROCEDURE — 6370000000 HC RX 637 (ALT 250 FOR IP): Performed by: PSYCHIATRY & NEUROLOGY

## 2020-03-26 PROCEDURE — 6370000000 HC RX 637 (ALT 250 FOR IP): Performed by: INTERNAL MEDICINE

## 2020-03-26 PROCEDURE — 99223 1ST HOSP IP/OBS HIGH 75: CPT | Performed by: NURSE PRACTITIONER

## 2020-03-26 RX ORDER — TRAZODONE HYDROCHLORIDE 50 MG/1
50 TABLET ORAL NIGHTLY
Status: DISCONTINUED | OUTPATIENT
Start: 2020-03-26 | End: 2020-04-03 | Stop reason: HOSPADM

## 2020-03-26 RX ORDER — ACETAMINOPHEN 500 MG
500 TABLET ORAL EVERY 4 HOURS PRN
Status: DISCONTINUED | OUTPATIENT
Start: 2020-03-26 | End: 2020-04-03 | Stop reason: HOSPADM

## 2020-03-26 RX ORDER — LORAZEPAM 2 MG/ML
1 INJECTION INTRAMUSCULAR EVERY 4 HOURS PRN
Status: DISCONTINUED | OUTPATIENT
Start: 2020-03-26 | End: 2020-04-03 | Stop reason: HOSPADM

## 2020-03-26 RX ORDER — ACETAMINOPHEN 325 MG/1
650 TABLET ORAL EVERY 4 HOURS PRN
Status: DISCONTINUED | OUTPATIENT
Start: 2020-03-26 | End: 2020-03-26

## 2020-03-26 RX ORDER — HALOPERIDOL 5 MG
5 TABLET ORAL EVERY 4 HOURS PRN
Status: DISCONTINUED | OUTPATIENT
Start: 2020-03-26 | End: 2020-04-03 | Stop reason: HOSPADM

## 2020-03-26 RX ORDER — LEVOTHYROXINE SODIUM 0.1 MG/1
100 TABLET ORAL DAILY
Status: DISCONTINUED | OUTPATIENT
Start: 2020-03-26 | End: 2020-04-03 | Stop reason: HOSPADM

## 2020-03-26 RX ORDER — CITALOPRAM 20 MG/1
20 TABLET ORAL DAILY
Status: DISCONTINUED | OUTPATIENT
Start: 2020-03-26 | End: 2020-04-03 | Stop reason: HOSPADM

## 2020-03-26 RX ORDER — ACETAMINOPHEN 325 MG/1
650 TABLET ORAL EVERY 4 HOURS PRN
Status: DISCONTINUED | OUTPATIENT
Start: 2020-03-26 | End: 2020-04-03 | Stop reason: HOSPADM

## 2020-03-26 RX ORDER — FAMOTIDINE 20 MG/1
20 TABLET, FILM COATED ORAL DAILY
Status: DISCONTINUED | OUTPATIENT
Start: 2020-03-26 | End: 2020-04-03 | Stop reason: HOSPADM

## 2020-03-26 RX ORDER — POLYETHYLENE GLYCOL 3350 17 G/17G
17 POWDER, FOR SOLUTION ORAL DAILY PRN
Status: DISCONTINUED | OUTPATIENT
Start: 2020-03-26 | End: 2020-04-03 | Stop reason: HOSPADM

## 2020-03-26 RX ORDER — MAGNESIUM HYDROXIDE/ALUMINUM HYDROXICE/SIMETHICONE 120; 1200; 1200 MG/30ML; MG/30ML; MG/30ML
30 SUSPENSION ORAL EVERY 6 HOURS PRN
Status: DISCONTINUED | OUTPATIENT
Start: 2020-03-26 | End: 2020-04-03 | Stop reason: HOSPADM

## 2020-03-26 RX ORDER — ATORVASTATIN CALCIUM 40 MG/1
80 TABLET, FILM COATED ORAL NIGHTLY
Status: DISCONTINUED | OUTPATIENT
Start: 2020-03-26 | End: 2020-04-03 | Stop reason: HOSPADM

## 2020-03-26 RX ORDER — DIVALPROEX SODIUM 500 MG/1
500 TABLET, EXTENDED RELEASE ORAL DAILY
Status: DISCONTINUED | OUTPATIENT
Start: 2020-03-26 | End: 2020-04-03 | Stop reason: HOSPADM

## 2020-03-26 RX ORDER — LORAZEPAM 1 MG/1
1 TABLET ORAL EVERY 4 HOURS PRN
Status: DISCONTINUED | OUTPATIENT
Start: 2020-03-26 | End: 2020-04-03 | Stop reason: HOSPADM

## 2020-03-26 RX ORDER — ACETAMINOPHEN 325 MG/1
325 TABLET ORAL EVERY 4 HOURS PRN
Status: DISCONTINUED | OUTPATIENT
Start: 2020-03-26 | End: 2020-04-03 | Stop reason: HOSPADM

## 2020-03-26 RX ORDER — TRAZODONE HYDROCHLORIDE 50 MG/1
50 TABLET ORAL NIGHTLY PRN
Status: DISCONTINUED | OUTPATIENT
Start: 2020-03-26 | End: 2020-03-26

## 2020-03-26 RX ORDER — ASPIRIN 81 MG/1
81 TABLET ORAL DAILY
Status: DISCONTINUED | OUTPATIENT
Start: 2020-03-26 | End: 2020-04-03 | Stop reason: HOSPADM

## 2020-03-26 RX ORDER — HALOPERIDOL 5 MG/ML
5 INJECTION INTRAMUSCULAR EVERY 4 HOURS PRN
Status: DISCONTINUED | OUTPATIENT
Start: 2020-03-26 | End: 2020-04-03 | Stop reason: HOSPADM

## 2020-03-26 RX ADMIN — LEVOTHYROXINE SODIUM 100 MCG: 100 TABLET ORAL at 12:14

## 2020-03-26 RX ADMIN — TRAZODONE HYDROCHLORIDE 50 MG: 50 TABLET ORAL at 20:07

## 2020-03-26 RX ADMIN — FAMOTIDINE 20 MG: 20 TABLET, FILM COATED ORAL at 12:14

## 2020-03-26 RX ADMIN — ASPIRIN 81 MG: 81 TABLET, COATED ORAL at 12:14

## 2020-03-26 RX ADMIN — LORAZEPAM 1 MG: 1 TABLET ORAL at 01:40

## 2020-03-26 RX ADMIN — ACETAMINOPHEN 650 MG: 325 TABLET ORAL at 01:40

## 2020-03-26 RX ADMIN — ACETAMINOPHEN 650 MG: 325 TABLET ORAL at 20:58

## 2020-03-26 RX ADMIN — DIVALPROEX SODIUM 500 MG: 500 TABLET, EXTENDED RELEASE ORAL at 20:14

## 2020-03-26 RX ADMIN — CITALOPRAM HYDROBROMIDE 20 MG: 20 TABLET ORAL at 12:14

## 2020-03-26 RX ADMIN — ATORVASTATIN CALCIUM 80 MG: 40 TABLET, FILM COATED ORAL at 20:06

## 2020-03-26 RX ADMIN — DEXTROMETHORPHAN HYDROBROMIDE AND QUINIDINE SULFATE 1 CAPSULE: 20; 10 CAPSULE, GELATIN COATED ORAL at 12:14

## 2020-03-26 ASSESSMENT — PAIN SCALES - GENERAL
PAINLEVEL_OUTOF10: 7
PAINLEVEL_OUTOF10: 8
PAINLEVEL_OUTOF10: 8
PAINLEVEL_OUTOF10: 0

## 2020-03-26 ASSESSMENT — LIFESTYLE VARIABLES: HISTORY_ALCOHOL_USE: NO

## 2020-03-26 ASSESSMENT — PAIN DESCRIPTION - LOCATION: LOCATION: HAND;LEG

## 2020-03-26 ASSESSMENT — PAIN DESCRIPTION - DESCRIPTORS: DESCRIPTORS: PATIENT UNABLE TO DESCRIBE

## 2020-03-26 ASSESSMENT — SLEEP AND FATIGUE QUESTIONNAIRES
DO YOU USE A SLEEP AID: NO
DO YOU HAVE DIFFICULTY SLEEPING: YES

## 2020-03-26 ASSESSMENT — PAIN - FUNCTIONAL ASSESSMENT: PAIN_FUNCTIONAL_ASSESSMENT: PREVENTS OR INTERFERES SOME ACTIVE ACTIVITIES AND ADLS

## 2020-03-26 ASSESSMENT — PAIN DESCRIPTION - PROGRESSION: CLINICAL_PROGRESSION: NOT CHANGED

## 2020-03-26 ASSESSMENT — PAIN DESCRIPTION - PAIN TYPE: TYPE: CHRONIC PAIN

## 2020-03-26 ASSESSMENT — PAIN DESCRIPTION - FREQUENCY: FREQUENCY: CONTINUOUS

## 2020-03-26 ASSESSMENT — PAIN DESCRIPTION - ORIENTATION: ORIENTATION: RIGHT

## 2020-03-26 ASSESSMENT — PAIN DESCRIPTION - ONSET: ONSET: SUDDEN

## 2020-03-26 NOTE — CONSULTS
Hospitalist Consult Note      Name:  Shante Moore /Age/Sex:   [de-identified]76 y.o. female)   MRN & CSN:  [id number] & [id number] Admission Date/Time: 3/26/2020  1:11 AM   Location:  1048/1048-01 PCP: Elaina Melissa MD       Hospital Day: 1    Assessment and Plan:   Shante Moore is a 76 y.o.  female  who presents with Major depressive disorder, severe [facility]  Hospitalist Team consulted for: Medical Management     1) MDD with SI  -Management as per Psychiatry team    2) Hx of CVA with residual Rt sided weakness and expressive aphasia 2019  -Start ASA and continue Lipitor    Other Chronic Medical conditions; medications resumed unless contraindicated  -HLD  -Hypothyroidism    Diet DIET GENERAL;   DVT Prophylaxis [] Lovenox, []  Heparin, [] SCDs, [] Ambulation   GI Prophylaxis [] PPI,  [] H2 Blocker,  [] Carafate,  [] Diet/Tube Feeds   Code Status Full Code   Disposition Patient requires continued admission due to depression   MDM [] Low, [x] Moderate,[]  High  Patient's risk as above due to      History of Present Illness:     Chief Complaint: Major depressive disorder, severe ([address]  Shante Moore is a 76 y.o.  female  who presents with MDD and SI. Patient reported Rt lower extremity pain; otherwise denied any chest pin, palpitations, dizziness, SOB. No fever, chills, N/V/D. Patient has hx of stroke with residual Rt sided weakness and expressive aphasia. Ten point ROS reviewed negative, unless as noted above    Objective:   No intake or output data in the 24 hours ending 20 1026   Vitals:   Vitals:    20 0730   BP: 118/69   Pulse: 71   Resp: 16   Temp: 97.4 °F (36.3 °C)   SpO2: 98%     Physical Exam:   GEN Awake female, sitting upright in bed in no apparent distress. Appears given age. EYES Pupils are equally round. No scleral erythema, discharge, or conjunctivitis. HENT Mucous membranes are moist. Oral pharynx without exudates, no evidence of thrush.   NECK Supple, no apparent thyromegaly or masses. RESP Clear to auscultation, no wheezes, rales or rhonchi. Symmetric chest movement while on room air. CARDIO/VASC S1/S2 auscultated. Regular rate without appreciable murmurs, rubs, or gallops. No JVD or carotid bruits. Peripheral pulses equal bilaterally and palpable. No peripheral edema. GI Abdomen is soft without significant tenderness, masses, or guarding. Bowel sounds are normoactive. Rectal exam deferred.  No costovertebral angle tenderness. Normal appearing external genitalia. Lee catheter is not present. HEME/LYMPH No palpable cervical lymphadenopathy and no hepatosplenomegaly. No petechiae or ecchymoses. MSK No gross joint deformities. SKIN Normal coloration, warm, dry. NEURO Expressive Aphasia wih Rt sided hemiparesis  PSYCH Awake, alert, oriented x 4. Affect appropriate. Medications:   Medications:    atorvastatin  80 mg Oral Nightly    citalopram  20 mg Oral Daily    dextromethorphan-quiNIDine  1 capsule Oral Daily    levothyroxine  100 mcg Oral Daily    famotidine  20 mg Oral Daily      Infusions:   PRN Meds: acetaminophen, 325 mg, Q4H PRN  traZODone, 50 mg, Nightly PRN  polyethylene glycol, 17 g, Daily PRN  LORazepam, 1 mg, Q4H PRN    Or  LORazepam, 1 mg, Q4H PRN  haloperidol lactate, 5 mg, Q4H PRN    Or  haloperidol, 5 mg, Q4H PRN  aluminum & magnesium hydroxide-simethicone, 30 mL, Q6H PRN  acetaminophen, 500 mg, Q4H PRN  acetaminophen, 650 mg, Q4H PRN      PMH: As listed above    Family Hx: Not Contributory    PSH: Appendectomy, Colonoscopy, Tonsillectomy and Adenoidectomy    Social Hx:  , Never a smoker, No alcohol use      Electronically signed by Harpal Devlin MD on 3/26/2020 at 10:26 AM

## 2020-03-26 NOTE — GROUP NOTE
Group Therapy Note    Date: 3/26/2020    Group Start Time: 2297  Group End Time: 5083    Number of Participants: 4/5    Type: Exercise/Recreation Group    Group Topic/Objective: Self-Care Discussion    Notes:  Pt participated well in group. Pt agreed with peers who reported poor self-care. Pt discussed wanting to participate in leisure more often after discharge. Status After Intervention:  Improved    Participation Level:  Active Listener and Interactive    Participation Quality: Appropriate, Attentive and Sharing    Speech:  normal    Thought Process/Content: Logical    Affective Functioning: Congruent    Mood: Bright    Level of consciousness:  Alert and Attentive    Response to Learning: Able to verbalize current knowledge/experience    Endings: None Reported    Modes of Intervention: Education, Support and Socialization    Discipline Responsible: Certified Therapeutic Recreation Specialist     Electronically signed by Alfredo Shah MA on 3/26/2020 at 4:22 PM
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
## 2020-03-26 NOTE — PLAN OF CARE
Problem: Pain:  Goal: Pain level will decrease  Description: Pain level will decrease  3/26/2020 1256 by Annette Ramos RN  Outcome: Ongoing  3/26/2020 0224 by Angely Ralph RN  Outcome: Ongoing  Goal: Control of acute pain  Description: Control of acute pain  3/26/2020 1256 by Annette Ramos RN  Outcome: Ongoing  3/26/2020 0224 by Angely Ralph RN  Outcome: Ongoing  Goal: Control of chronic pain  Description: Control of chronic pain  3/26/2020 1256 by Annette Ramos RN  Outcome: Ongoing  3/26/2020 0224 by Angely Ralph RN  Outcome: Ongoing     Problem: Falls - Risk of:  Goal: Will remain free from falls  Description: Will remain free from falls  3/26/2020 1256 by Annette Ramos RN  Outcome: Ongoing  3/26/2020 0224 by Angely Ralph RN  Outcome: Ongoing  Goal: Absence of physical injury  Description: Absence of physical injury  3/26/2020 1256 by Annette Ramos RN  Outcome: Ongoing  3/26/2020 0224 by Angely Ralph RN  Outcome: Ongoing     Problem: Depressive Behavior With or Without Suicide Precautions:  Goal: Able to verbalize acceptance of life and situations over which he or she has no control  Description: Able to verbalize acceptance of life and situations over which he or she has no control  3/26/2020 1256 by Annette Ramos RN  Outcome: Ongoing  3/26/2020 0224 by Angely Ralph RN  Outcome: Ongoing  Goal: Able to verbalize and/or display a decrease in depressive symptoms  Description: Able to verbalize and/or display a decrease in depressive symptoms  3/26/2020 1256 by Annette Ramos RN  Outcome: Ongoing  3/26/2020 0224 by Angely Ralph RN  Outcome: Ongoing  Goal: Ability to disclose and discuss suicidal ideas will improve  Description: Ability to disclose and discuss suicidal ideas will improve  3/26/2020 1256 by Annette Ramos RN  Outcome: Ongoing  3/26/2020 0224 by Angely Ralph RN  Outcome: Ongoing  Goal: Able to verbalize support systems  Description: Able to

## 2020-03-27 LAB
AMMONIA: 24 UMOL/L (ref 11–51)
BASOPHILS ABSOLUTE: 0.1 K/CU MM
BASOPHILS RELATIVE PERCENT: 1.8 % (ref 0–1)
CHOLESTEROL: 186 MG/DL
DIFFERENTIAL TYPE: ABNORMAL
EOSINOPHILS ABSOLUTE: 0.2 K/CU MM
EOSINOPHILS RELATIVE PERCENT: 4.5 % (ref 0–3)
ESTIMATED AVERAGE GLUCOSE: 108 MG/DL
HBA1C MFR BLD: 5.4 % (ref 4.2–6.3)
HCT VFR BLD CALC: 40.9 % (ref 37–47)
HDLC SERPL-MCNC: 82 MG/DL
HEMOGLOBIN: 12.9 GM/DL (ref 12.5–16)
IMMATURE NEUTROPHIL %: 0.5 % (ref 0–0.43)
LDL CHOLESTEROL DIRECT: 111 MG/DL
LYMPHOCYTES ABSOLUTE: 1.6 K/CU MM
LYMPHOCYTES RELATIVE PERCENT: 34.9 % (ref 24–44)
MCH RBC QN AUTO: 31.6 PG (ref 27–31)
MCHC RBC AUTO-ENTMCNC: 31.5 % (ref 32–36)
MCV RBC AUTO: 100.2 FL (ref 78–100)
MONOCYTES ABSOLUTE: 0.4 K/CU MM
MONOCYTES RELATIVE PERCENT: 9.7 % (ref 0–4)
PDW BLD-RTO: 12.6 % (ref 11.7–14.9)
PLATELET # BLD: 209 K/CU MM (ref 140–440)
PMV BLD AUTO: 9.1 FL (ref 7.5–11.1)
RBC # BLD: 4.08 M/CU MM (ref 4.2–5.4)
SEGMENTED NEUTROPHILS ABSOLUTE COUNT: 2.2 K/CU MM
SEGMENTED NEUTROPHILS RELATIVE PERCENT: 48.6 % (ref 36–66)
T4 FREE: 1.36 NG/DL (ref 0.9–1.8)
TOTAL IMMATURE NEUTOROPHIL: 0.02 K/CU MM
TRIGL SERPL-MCNC: 50 MG/DL
TSH HIGH SENSITIVITY: 3.64 UIU/ML (ref 0.27–4.2)
VITAMIN D 25-HYDROXY: 22.91 NG/ML
WBC # BLD: 4.4 K/CU MM (ref 4–10.5)

## 2020-03-27 PROCEDURE — 97166 OT EVAL MOD COMPLEX 45 MIN: CPT

## 2020-03-27 PROCEDURE — 84439 ASSAY OF FREE THYROXINE: CPT

## 2020-03-27 PROCEDURE — 99233 SBSQ HOSP IP/OBS HIGH 50: CPT | Performed by: NURSE PRACTITIONER

## 2020-03-27 PROCEDURE — 83721 ASSAY OF BLOOD LIPOPROTEIN: CPT

## 2020-03-27 PROCEDURE — 80061 LIPID PANEL: CPT

## 2020-03-27 PROCEDURE — 85025 COMPLETE CBC W/AUTO DIFF WBC: CPT

## 2020-03-27 PROCEDURE — 82306 VITAMIN D 25 HYDROXY: CPT

## 2020-03-27 PROCEDURE — 97162 PT EVAL MOD COMPLEX 30 MIN: CPT

## 2020-03-27 PROCEDURE — 97530 THERAPEUTIC ACTIVITIES: CPT

## 2020-03-27 PROCEDURE — 1240000000 HC EMOTIONAL WELLNESS R&B

## 2020-03-27 PROCEDURE — 83036 HEMOGLOBIN GLYCOSYLATED A1C: CPT

## 2020-03-27 PROCEDURE — 6370000000 HC RX 637 (ALT 250 FOR IP): Performed by: INTERNAL MEDICINE

## 2020-03-27 PROCEDURE — 84443 ASSAY THYROID STIM HORMONE: CPT

## 2020-03-27 PROCEDURE — 6370000000 HC RX 637 (ALT 250 FOR IP): Performed by: NURSE PRACTITIONER

## 2020-03-27 PROCEDURE — 36415 COLL VENOUS BLD VENIPUNCTURE: CPT

## 2020-03-27 PROCEDURE — 82140 ASSAY OF AMMONIA: CPT

## 2020-03-27 RX ADMIN — TRAZODONE HYDROCHLORIDE 50 MG: 50 TABLET ORAL at 20:29

## 2020-03-27 RX ADMIN — DEXTROMETHORPHAN HYDROBROMIDE AND QUINIDINE SULFATE 1 CAPSULE: 20; 10 CAPSULE, GELATIN COATED ORAL at 20:31

## 2020-03-27 RX ADMIN — FAMOTIDINE 20 MG: 20 TABLET, FILM COATED ORAL at 08:17

## 2020-03-27 RX ADMIN — CITALOPRAM HYDROBROMIDE 20 MG: 20 TABLET ORAL at 08:17

## 2020-03-27 RX ADMIN — ATORVASTATIN CALCIUM 80 MG: 40 TABLET, FILM COATED ORAL at 20:29

## 2020-03-27 RX ADMIN — DIVALPROEX SODIUM 500 MG: 500 TABLET, EXTENDED RELEASE ORAL at 08:17

## 2020-03-27 RX ADMIN — LEVOTHYROXINE SODIUM 100 MCG: 100 TABLET ORAL at 08:17

## 2020-03-27 RX ADMIN — ASPIRIN 81 MG: 81 TABLET, COATED ORAL at 08:17

## 2020-03-27 ASSESSMENT — PAIN SCALES - GENERAL
PAINLEVEL_OUTOF10: 0
PAINLEVEL_OUTOF10: 0

## 2020-03-27 ASSESSMENT — PAIN DESCRIPTION - LOCATION: LOCATION: HAND;LEG

## 2020-03-27 ASSESSMENT — PAIN SCALES - WONG BAKER: WONGBAKER_NUMERICALRESPONSE: 0

## 2020-03-27 ASSESSMENT — PAIN DESCRIPTION - ORIENTATION: ORIENTATION: RIGHT

## 2020-03-27 NOTE — PROGRESS NOTES
Physical Therapy    Facility/Department: [facility] GERIATRIC PSYCH UNIT  Initial Assessment    NAME: Yoan Woodard  :   MRN: [id number]    Date of Service: 3/27/2020    Discharge Recommendations:  Continue to assess pending progress, Subacute/Skilled Nursing Facility   PT Equipment Recommendations  Equipment Needed: Yes  Mobility Devices: Canes  Other: magdalene-walker    Assessment   Body structures, Functions, Activity limitations: Decreased functional mobility ; Decreased safe awareness;Decreased balance;Decreased coordination;Decreased endurance; Increased pain;Decreased sensation;Decreased strength;Decreased posture;Decreased fine motor control  Assessment: P is 77 yo female who presents with depression and recent history of L CVA. P presents with significant deficits in strength, ROM, balance and overall reduced safety with mobility. Recommend skilled PT to address deficits and maximize functional potential.  Treatment Diagnosis: impaired strength, balance, gait  Prognosis: Good  Decision Making: Medium Complexity  History: see below significant for CVA  Exam: assessed strength, balance, transfers  Clinical Presentation: evolving  PT Education: PT Role;Plan of Care;Goals; General Safety  Patient Education: Discussed at length with patient and patient's nurse that it is not safe to ambulate without the R AFO. P has her AFO, but does not have footwear. The AFO is therefore unsafe to use. P should not walk 2/2 great risk for injury and falling. Barriers to Learning: aphasia, impulsive  REQUIRES PT FOLLOW UP: Yes  Activity Tolerance  Activity Tolerance: Patient Tolerated treatment well  Activity Tolerance: limited by aphasia       Patient Diagnosis(es): There were no encounter diagnoses.      has a past medical history of Allergic rhinitis, Contact dermatitis, Contact dermatitis, CVA (cerebral vascular accident) ([facility] [address] Diastolic dysfunction, Hypothyroidism, Pure hypercholesterolemia, and Right hemiparesis Friends  Occupation: Unemployed  Additional Comments: Information gathered from chart and pt. Pt had difficulty with communication. Pt reports she uses cane to walk. Cognition   Cognition  Overall Cognitive Status: Exceptions  Arousal/Alertness: Appropriate responses to stimuli  Following Commands: Follows one step commands with repetition  Attention Span: Appears intact  Safety Judgement: Decreased awareness of need for assistance  Problem Solving: Decreased awareness of errors  Insights: Not aware of deficits  Initiation: Requires cues for some  Sequencing: Requires cues for some    Objective     Observation/Palpation  Observation: P sitting up in main room. P R ankle rests in plantarflexion and inversion. AFO not on during session. PROM RLE (degrees)  RLE PROM: Exceptions  RLE General PROM: impaired DF/DF unable to attain 0 degrees neutral  AROM RLE (degrees)  RLE AROM: Exceptions  RLE General AROM: impaired ankle movement  PROM LLE (degrees)  LLE PROM: WFL  AROM LLE (degrees)  LLE AROM : WFL  Strength RLE  Strength RLE: Exception  R Hip Flexion: 3+/5  R Knee Extension: 3+/5  R Ankle Dorsiflexion: 0/5  Strength LLE  Strength LLE: Exception  L Hip Flexion: 4+/5  L Knee Extension: 4+/5  L Ankle Dorsiflexion: 4+/5  Tone RLE  RLE Tone: Hypertonic  Tone LLE  LLE Tone: Hypertonic  LLE Modified Brenda Scale  LLE Modified Brenda Scale Completed: No  Motor Control  Gross Motor?: Exceptions  Comments: synergistic movement noted on R side  Sensation  Overall Sensation Status: Impaired(numbness RUE per p report)  Bed mobility  Comment: Not assessed. P seen up in chair  Transfers  Sit to Stand: Contact guard assistance  Stand to sit: Contact guard assistance  Comment: P performs sit <> stand with CGA and reduced weight bearing through RLE. P performs x 4 reps during session with 1 UE support. P impulsive and requires mod cues for safety. P performs 3 stand pivot transfers with min A.   Ambulation  Ambulation?: No(P 1250)  Mobility Inpatient CMS G-Code Modifier : CK (03/27/20 1250)          Goals  Short term goals  Time Frame for Short term goals: 1 week or until discharge  Short term goal 1: P will perform bed mobility with Amie. Short term goal 2: P will perform stand pivot transfer with supervision demonstrating improved safety and control. Short term goal 3: P will perform sit <> stand to wall rail with supervision. Short term goal 4: P will ambulate x 20' with wall rail if AFO and footwear can be obtained demonstrating improved mobility and function.        Therapy Time   Individual Concurrent Group Co-treatment   Time In       1030   Time Out       1059   Minutes       29   Timed Code Treatment Minutes: 21 Minutes       Suellen Lanes, PT    Electronically signed by Suellen Lanes, PT on 3/27/2020 at 12:50 PM

## 2020-03-27 NOTE — GROUP NOTE
Group Therapy Note    Date: 3/27/2020    Group Start Time: 0830  Group End Time: 0900    Number of Participants: 6/7    Type: Morning Goals Group/ Community Meeting    Group Topic/Objective: Set Goal For The Day and to review Unit Rules and Regulations. Patient's Goal:  Pt unable to state a goal at this time. Notes:  Pt presented with bright affect during the group. Pt noted to struggle with open ended questions, but was good with yes/no and either/or. Pt expressed feeling \"good\" and is grateful for \"this here. \"    Depression (0-10): 0    Anxiety (0-10): 0    Irritability/Aggitation (0-10): 0    Status After Intervention:  Improved    Participation Level:  Active Listener and Interactive    Participation Quality: Appropriate, Attentive and Sharing    Speech:  normal    Thought Process/Content: Logical    Affective Functioning: Congruent    Mood: Bright    Level of consciousness:  Alert and Attentive    Response to Learning: Able to verbalize current knowledge/experience    Endings: None Reported    Modes of Intervention: Education, Support and Socialization    Discipline Responsible: Certified Therapeutic Recreation Specialist     Electronically signed by Justin Cuellar MA on 3/27/2020 at 10:20 AM

## 2020-03-27 NOTE — GROUP NOTE
Group Therapy Note    Date: 3/27/2020    Group Start Time: 1500  Group End Time: 1577  Group Topic: Recreational    [address]    ISABELLA CarrS, MA        Group Therapy Note    Attendees: 5/5       Notes:  Pts participated in recreational therapy group; 5 Second Rule. Pts were encouraged to engage in a leisure activity to promote socialization, positive mood, and coping with negative emotions. Pt participated well in the group. Pt noted to require extra time, but engaged appropriately when given. Pt expressed enjoyment in the group. Status After Intervention:  Unchanged    Participation Level:  Active Listener and Interactive    Participation Quality: Appropriate, Attentive and Sharing      Speech:  normal      Thought Process/Content: Logical      Affective Functioning: Congruent      Mood: Bright      Level of consciousness:  Alert and Attentive      Response to Learning: Able to verbalize current knowledge/experience and Able to verbalize/acknowledge new learning      Endings: None Reported    Modes of Intervention: Socialization and Activity      Discipline Responsible: Certified Therapeutic Recreation Specialist       Signature:  Cristian Arguelles, [address] [place] [address]

## 2020-03-27 NOTE — BH NOTE
Group Therapy Note    Date: 3/26/2020  Start Time: 2000  End Time:  2030  Number of Participants: 3    Type of Group: Wrap-Up    Patient's Goal:  None    Notes:  PT was pleasant, cooperative. Full of smiles, very kind to everyone in the group. Thought process somewhat slow, but overall in a pleasant mood. States not experiencing any thoughts of depression, anxiety or irritability. Status After Intervention:  Improved    Participation Level:  Active Listener and Interactive    Participation Quality: Appropriate and Attentive      Speech:  normal      Thought Process/Content: Linear      Affective Functioning: Congruent      Mood: euphoric      Level of consciousness:  Alert and Attentive      Response to Learning: Progressing to goal      Endings: None Reported    Modes of Intervention: Education      Discipline Responsible: Behavorial Health Tech      Signature:  Nelida

## 2020-03-27 NOTE — PROGRESS NOTES
three times daily. dextromethorphan-quiNIDine (NUEDEXTA) 20-10 MG CAPS per capsule, Take 1 capsule by mouth daily  atorvastatin (LIPITOR) 80 MG tablet, Take 1 tablet by mouth daily  ranitidine (ZANTAC) 150 MG tablet, Take 1 tablet by mouth 2 times daily  levothyroxine (SYNTHROID) 100 MCG tablet, Take 1 tablet by mouth daily  citalopram (CELEXA) 20 MG tablet, Take 1 tablet by mouth daily  Elastic Bandages & Supports (ACE ANKLE BRACE) [facility], Request AFO brace for right ankle    Past Medical History:   Diagnosis Date    Allergic rhinitis     Contact dermatitis     hands    Contact dermatitis     b/l hands    CVA (cerebral vascular accident) ([facility] [address] 07/04/2019    MCA Left parietal & temporal lobes, lentiform nucleus & perventricular white matter    Diastolic dysfunction     grade 1    Hypothyroidism     Pure hypercholesterolemia     Right hemiparesis ([facility] [address] 07/04/2019        Patient Active Problem List   Diagnosis    Hypothyroidism    Pure hypercholesterolemia    Overweight (BMI 25.0-29. 9)    Cerebrovascular accident (CVA) due to thrombosis of precerebral artery (HCC)    Expressive aphasia    Right arm weakness    Right leg weakness    Major depressive disorder, severe (HCC)    Severe recurrent major depression without psychotic features ([facility] [address]    Aggressive behavior    Suicidal ideation    Homicidal ideation       Review of Systems    OBJECTIVE  Vital Signs:  Vitals:    03/27/20 0800   BP: 120/63   Pulse: 91   Resp: 16   Temp: 97.6 °F (36.4 °C)   SpO2: 99%       Labs:  Recent Results (from the past 48 hour(s))   Salicylate    Collection Time: 03/25/20  2:36 PM   Result Value Ref Range    Salicylate Lvl <0.8 (L) 15 - 30 MG/DL    DOSE AMOUNT DOSE AMT. GIVEN - UNKNOWN     DOSE TIME DOSE TIME GIVEN - UNKNOWN    Ethanol    Collection Time: 03/25/20  2:36 PM   Result Value Ref Range    Alcohol Scrn <0.01  THE VALUE IS BELOW OUR DETECTION LIMIT.    <0.01 %WT/VOL   Acetaminophen Level    Collection Time: ONE WEEK. * UNCONFIRMED POSITIVES MAY  NOT MEET FORENSIC REQUIREMENTS.              Urinalysis    Collection Time: 03/25/20  3:04 PM   Result Value Ref Range    Color, UA YELLOW YELLOW    Clarity, UA CLEAR CLEAR    Glucose, Urine NEGATIVE NEGATIVE MG/DL    Bilirubin Urine NEGATIVE NEGATIVE MG/DL    Ketones, Urine NEGATIVE NEGATIVE MG/DL    Specific Gravity, UA 1.019 1.001 - 1.035    Blood, Urine NEGATIVE NEGATIVE    pH, Urine 5.0 5.0 - 8.0    Protein, UA NEGATIVE NEGATIVE MG/DL    Urobilinogen, Urine NORMAL 0.2 - 1.0 MG/DL    Nitrite Urine, Quantitative NEGATIVE NEGATIVE    Leukocyte Esterase, Urine SMALL (A) NEGATIVE    RBC, UA NONE SEEN 0 - 6 /HPF    WBC, UA 4 0 - 5 /HPF    Bacteria, UA RARE (A) NEGATIVE /HPF    Squam Epithel, UA 1 /HPF    Mucus, UA RARE (A) NEGATIVE HPF    Trichomonas, UA NONE SEEN NONE SEEN /HPF   TSH without Reflex    Collection Time: 03/27/20  6:30 AM   Result Value Ref Range    TSH, High Sensitivity 3.640 0.270 - 4.20 uIu/ml   Vitamin D 25 Hydroxy    Collection Time: 03/27/20  6:30 AM   Result Value Ref Range    Vit D, 25-Hydroxy 22.91 >20 NG/ML   CBC auto differential    Collection Time: 03/27/20  6:30 AM   Result Value Ref Range    WBC 4.4 4.0 - 10.5 K/CU MM    RBC 4.08 (L) 4.2 - 5.4 M/CU MM    Hemoglobin 12.9 12.5 - 16.0 GM/DL    Hematocrit 40.9 37 - 47 %    .2 (H) 78 - 100 FL    MCH 31.6 (H) 27 - 31 PG    MCHC 31.5 (L) 32.0 - 36.0 %    RDW 12.6 11.7 - 14.9 %    Platelets 050 766 - 408 K/CU MM    MPV 9.1 7.5 - 11.1 FL    Differential Type AUTOMATED DIFFERENTIAL     Segs Relative 48.6 36 - 66 %    Lymphocytes % 34.9 24 - 44 %    Monocytes % 9.7 (H) 0 - 4 %    Eosinophils % 4.5 (H) 0 - 3 %    Basophils % 1.8 (H) 0 - 1 %    Segs Absolute 2.2 K/CU MM    Lymphocytes Absolute 1.6 K/CU MM    Monocytes Absolute 0.4 K/CU MM    Eosinophils Absolute 0.2 K/CU MM    Basophils Absolute 0.1 K/CU MM    Immature Neutrophil % 0.5 (H) 0 - 0.43 %    Total Immature Neutrophil 0.02 K/CU MM   T4, free Collection Time: 03/27/20  6:30 AM   Result Value Ref Range    T4 Free 1.36 0.9 - 1.8 NG/DL   Lipid panel - fasting    Collection Time: 03/27/20  6:30 AM   Result Value Ref Range    Triglycerides 50 <150 MG/DL    Cholesterol 186 <200 MG/DL    HDL 82 >40 MG/DL    LDL Direct 111 (H) <100 MG/DL   Ammonia    Collection Time: 03/27/20  6:30 AM   Result Value Ref Range    Ammonia 24 11 - 51 UMOL/L   Hemoglobin A1c    Collection Time: 03/27/20  6:40 AM   Result Value Ref Range    Hemoglobin A1C 5.4 4.2 - 6.3 %    eAG 108 mg/dL       PSYCHIATRIC ASSESSMENT / MENTAL STATUS EXAM:   Vitals: Blood pressure 120/63, pulse 91, temperature 97.6 °F (36.4 °C), temperature source Temporal, resp. rate 16, height 5' 2\" (1.575 m), weight 153 lb (69.4 kg), SpO2 99 %. CONSTITUTIONAL:    Appearance: appears stated age. alert and oriented to person, place, time & situation. no acute distress. Adequate grooming and hygeine. Good eye contact. No prominent physical abnormalities. Attitude: Manner is cooperative and pleasant  Motor: No psychomotor agitation, retardation or abnormal movements noted  Speech: Clearly articulated; normal rate, volume, tone & amount. Language: intact understanding and production  Mood:  Affect: euthymic, full range, non-labile, congruent with mood and content of speech  Thought Production: Spontaneous. Thought Form: Coherent, linear, logical & goal-directed. No tangentiality or circumstantiality. No flight of ideas or loosening of associations. Thought Content/Perceptions: No BIB, no AVH, no delusion  Insight:  Judgment  Memory: Immediate, recent, and remote appear intact, though not formally tested. Attention: maintained throughout interview  Fund of knowledge: Average  Gait/Balance: WNL/WNL    IMPRESSION:   No change in diagnosis        PLAN:  Psychiatric management:medication initiation and titration, group and individual therapy, safe and theraputic environment.   Status of problem/condition:

## 2020-03-28 PROCEDURE — 6370000000 HC RX 637 (ALT 250 FOR IP): Performed by: INTERNAL MEDICINE

## 2020-03-28 PROCEDURE — 1240000000 HC EMOTIONAL WELLNESS R&B

## 2020-03-28 PROCEDURE — 6370000000 HC RX 637 (ALT 250 FOR IP): Performed by: NURSE PRACTITIONER

## 2020-03-28 PROCEDURE — 92610 EVALUATE SWALLOWING FUNCTION: CPT

## 2020-03-28 PROCEDURE — 99232 SBSQ HOSP IP/OBS MODERATE 35: CPT | Performed by: PSYCHIATRY & NEUROLOGY

## 2020-03-28 RX ADMIN — FAMOTIDINE 20 MG: 20 TABLET, FILM COATED ORAL at 07:55

## 2020-03-28 RX ADMIN — ATORVASTATIN CALCIUM 80 MG: 40 TABLET, FILM COATED ORAL at 20:25

## 2020-03-28 RX ADMIN — DEXTROMETHORPHAN HYDROBROMIDE AND QUINIDINE SULFATE 1 CAPSULE: 20; 10 CAPSULE, GELATIN COATED ORAL at 11:45

## 2020-03-28 RX ADMIN — DIVALPROEX SODIUM 500 MG: 500 TABLET, EXTENDED RELEASE ORAL at 07:56

## 2020-03-28 RX ADMIN — ASPIRIN 81 MG: 81 TABLET, COATED ORAL at 07:56

## 2020-03-28 RX ADMIN — TRAZODONE HYDROCHLORIDE 50 MG: 50 TABLET ORAL at 20:25

## 2020-03-28 RX ADMIN — LEVOTHYROXINE SODIUM 100 MCG: 100 TABLET ORAL at 07:55

## 2020-03-28 RX ADMIN — CITALOPRAM HYDROBROMIDE 20 MG: 20 TABLET ORAL at 07:56

## 2020-03-28 ASSESSMENT — PAIN SCALES - GENERAL: PAINLEVEL_OUTOF10: 0

## 2020-03-28 NOTE — PROGRESS NOTES
Unknown  Consistencies Administered: Reg solid; Thin;Thin - straw;Dysphagia Pureed (Dysphagia I); Dysphagia Soft and Bite-Sized (Dysphagia III)         Vision/Hearing  Vision  Vision: Within Functional Limits  Hearing  Hearing: Within functional limits    Oral Motor Deficits  Oral/Motor  Oral Motor:  Within functional limits    Oral Phase Dysfunction  Oral Phase  Oral Phase: WNL     Indicators of Pharyngeal Phase Dysfunction   Pharyngeal Phase  Pharyngeal Phase: WNL    Prognosis  Prognosis  Prognosis for safe diet advancement: excellent  Individuals consulted  Consulted and agree with results and recommendations: RN;Patient    Education          Therapy Time  SLP Individual Minutes  Time In: 1111  Time Out: [facility]  Minutes: [address] [place], [place]  3/28/2020 11:53 AM

## 2020-03-28 NOTE — GROUP NOTE
Group Therapy Note    Date: 3/28/2020    Group Start Time: 8376  Group End Time: 0780  Group Topic: Healthy Living/Wellness/ Exercise Group    [address] [facility]    JENNA Lawrence LSW    Group Therapy Note    Attendees: 4/6       Patient's Goal: \"pray\"    Notes: Patient actively participated in group. Completed exercises in an exercise card game with peers.      Status After Intervention:  Improved    Participation Level: Interactive    Participation Quality: Appropriate    Speech:  normal    Thought Process/Content: Linear    Affective Functioning: Congruent    Mood: elevated    Level of consciousness:  Alert and Attentive    Response to Learning: Able to verbalize current knowledge/experience    Endings: None Reported    Modes of Intervention: Activity and Movement    Discipline Responsible: /Counselor    Signature: JENNA Lawrence LSW

## 2020-03-28 NOTE — PROGRESS NOTES
bedtime for 1 week, then increase by 1/2 tablet per week up to three times daily. dextromethorphan-quiNIDine (NUEDEXTA) 20-10 MG CAPS per capsule, Take 1 capsule by mouth daily  atorvastatin (LIPITOR) 80 MG tablet, Take 1 tablet by mouth daily  ranitidine (ZANTAC) 150 MG tablet, Take 1 tablet by mouth 2 times daily  levothyroxine (SYNTHROID) 100 MCG tablet, Take 1 tablet by mouth daily  citalopram (CELEXA) 20 MG tablet, Take 1 tablet by mouth daily  Elastic Bandages & Supports (ACE ANKLE BRACE[place] [facility] [place]    Past Medical History:   Diagnosis Date    Allergic rhinitis     Contact dermatitis     hands    Contact dermatitis     b/l hands    CVA (cerebral vascular accident) ([facility] [address] 07/04/2019    MCA Left parietal & temporal lobes, lentiform nucleus & perventricular white matter    Diastolic dysfunction     grade 1    Hypothyroidism     Pure hypercholesterolemia     Right hemiparesis ([facility] [address] 07/04/2019        Patient Active Problem List   Diagnosis    Hypothyroidism    Pure hypercholesterolemia    Overweight (BMI 25.0-29. 9)    Cerebrovascular accident (CVA) due to thrombosis of precerebral artery (HCC)    Expressive aphasia    Right arm weakness    Right leg weakness    Major depressive disorder, severe (HCC)    Severe recurrent major depression without psychotic features ([facility] [address]    Aggressive behavior    Suicidal ideation    Homicidal ideation       Review of Systems    OBJECTIVE  Vital Signs:  Vitals:    03/28/20 0800   BP: (!) 109/59   Pulse: 83   Resp: 16   Temp: 97.8 °F (36.6 °C)   SpO2: 99%       Labs:  Recent Results (from the past 48 hour(s))   TSH without Reflex    Collection Time: 03/27/20  6:30 AM   Result Value Ref Range    TSH, High Sensitivity 3.640 0.270 - 4.20 uIu/ml   Vitamin D 25 Hydroxy    Collection Time: 03/27/20  6:30 AM   Result Value Ref Range    Vit D, 25-Hydroxy 22.91 >20 NG/ML   CBC auto differential    Collection Time: 03/27/20  6:30 AM   Result amount. Language: intact understanding and production  Mood:  Affect: euthymic, full range, non-labile, congruent with mood and content of speech  Thought Production: Spontaneous. Thought Form: Coherent, linear, logical & goal-directed. No tangentiality or circumstantiality. No flight of ideas or loosening of associations. Thought Content/Perceptions: No BIB, no AVH, no delusion  Insight:  Judgment  Memory: Immediate, recent, and remote appear intact, though not formally tested. Attention: maintained throughout interview  Fund of knowledge: Average  Gait/Balance: WNL/WNL    IMPRESSION:   No change in diagnosis        PLAN:  Psychiatric management:medication initiation and titration, group and individual therapy, safe and theraputic environment. Status of problem/condition: ?Improving  Medical co-morbidities: [facility], appreciate assistance  Legal Status:Pending  Disposition:estimated LOS: Pending. The treatment team reviewed with the patient the diagnosis and treatment recommendations to include the risks, benefits, and side effects of chosen medications. The patient verbalized understanding and agreed with the treatment regimen as outlined above. The patient was encouraged to participate in groups. Medical records, Labs, Diagnotic tests reviewed  q15 min safety checks for safety  Interval History. Review current labs  Continue current medications  Supportive Therapy Provided  Pt had an opportunity to ask questions and address concerns  Pt encouraged to continue therapy group or individual.  Pt was in agreement with treatment plan. The risks benefits and side effects of medications were discussed with the patient, including alternatives and no treatment.     Electronically signed by Chapo Jacques DO on 3/28/2020 at 10:44 AM

## 2020-03-28 NOTE — BH NOTE
Group Therapy Note    Date: 3/27/2020  Start Time: 2000  End Time:  2030  Number of Participants: 5    Type of Group: Wrap-Up    Patient's Goal:  None    Notes:  PT was cooperative and pleasant. Responses were repeated from what the patient before her said. Seemed preoccupied. States not feeling any depression, a little of anxiety and no irritability.     Status After Intervention:  Unchanged    Participation Level: Minimal    Participation Quality: Inappropriate      Speech:  hesitant      Thought Process/Content: Circumstantial      Affective Functioning: Congruent      Mood: Preoccupied      Level of consciousness:  Alert and Preoccupied      Endings: None Reported    Modes of Intervention: Education      Discipline Responsible: Behavorial Health Tech      Signature:  Nelida

## 2020-03-28 NOTE — PROGRESS NOTES
Pt alert to person, has expressive aphasia making it difficult to assess orientation completely. Pt very pleasant and cooperative with meds and care this shift. Pt independent with transfer and mobilizing w/c to and from room. Pt continent of bowel and bladder during the shift. Pt with good appetite and tolerating fluids w/o difficulty. Speech here to evaluatie pt d/t nursing concerns that pt was coughing during meals. Pt did well and no further recommendations were given.

## 2020-03-28 NOTE — PLAN OF CARE
systems  3/27/2020 2209 by Bessy Dan RN  Outcome: Ongoing  3/27/2020 1346 by Buffy Phillips RN  Outcome: Ongoing  Goal: Absence of self-harm  Description: Absence of self-harm  3/27/2020 2209 by Bessy Dan RN  Outcome: Ongoing  3/27/2020 1346 by Buffy Phillips RN  Outcome: Ongoing  Goal: Patient specific goal  Description: Patient specific goal  3/27/2020 2209 by Bessy Dan RN  Outcome: Ongoing  3/27/2020 1346 by Buffy Phillips RN  Outcome: Ongoing  Goal: Participates in care planning  Description: Participates in care planning  3/27/2020 2209 by Bessy Dan RN  Outcome: Ongoing  3/27/2020 1346 by Buffy Phillips RN  Outcome: Ongoing     Problem: Suicide risk  Goal: Provide patient with safe environment  Description: Provide patient with safe environment  3/27/2020 2209 by Bessy Dan RN  Outcome: Ongoing  3/27/2020 1346 by Buffy Phillips RN  Outcome: Ongoing

## 2020-03-28 NOTE — GROUP NOTE
Group Therapy Note    Date: 3/28/2020    Group Start Time: 0830  Group End Time: 0900  Group Topic: Community Meeting/ AM Goals Group    [facility] [place] [facility]    JENNA Love LSW    Group Therapy Note    Attendees: 6/6       Patient's Goal: \"pray\"    Notes: Patient actively participated in group. Patient stated that she was feeling \"beautiful\" and grateful for \"beautiful day\". Patient reported getting restful sleep last night. Patient rated her depression, anxiety, and irritability/agitation as a 0, on a scale of 0 to 10, with 0 being not at all.      Status After Intervention:  Unchanged    Participation Level: Interactive    Participation Quality: Appropriate and Sharing    Speech:  normal    Thought Process/Content: Linear    Affective Functioning: Congruent    Mood: elevated    Level of consciousness:  Alert and Attentive    Response to Learning: Able to verbalize current knowledge/experience    Endings: None Reported    Modes of Intervention: Exploration and Clarifying    Discipline Responsible: /Counselor    Signature: JENNA Love LSW

## 2020-03-28 NOTE — PLAN OF CARE
Content:Normal: No  Thought Content: Poverty of Content  Hallucinations: None  Delusions: No  Memory:Normal: No  Memory: Poor Recent, Poor Remote  Insight and Judgment: No  Insight and Judgment: Poor Judgment  Present Suicidal Ideation: No  Present Homicidal Ideation: No    Daily Assessment Last Entry:   Daily Sleep (WDL): Within Defined Limits  Patient Currently in Pain: No  Daily Nutrition (WDL): Within Defined Limits    Patient Monitoring:  Frequency of Checks: 4 times per hour, close    Psychiatric Symptoms:   Depression Symptoms  Depression Symptoms: No problems reported or observed. Anxiety Symptoms  Anxiety Symptoms: Unexplained fears  Margarita Symptoms  Margarita Symptoms: Poor judgment     Psychosis Symptoms  Delusion Type: No problems reported or observed. Suicide Risk CSSR-S:  1) Within the past month, have you wished you were dead or wished you could go to sleep and not wake up? : Yes  2) Have you actually had any thoughts of killing yourself? : Yes  3) Have you been thinking about how you might kill yourself? : No  4) Have you had these thoughts and had some intention of acting on them? : No  5) Have you started to work out or worked out the details of how to kill yourself?  Do you intend to carry out this plan? : No  6) Have you ever done anything, started to do anything, or prepared to do anything to end your life?: Yes    EDUCATION:   Learner Progress Toward Treatment Goals: Reviewed goals and plan of care    Method: Group    Outcome: Verbalized understanding and Needs reinforcement    PATIENT GOALS: Compliance with unit programming and med titration    PLAN/TREATMENT RECOMMENDATIONS UPDATE: Med titration  Estimated Length of Stay Update: 6 days  Estimated Discharge Date Update: 04/04/2020  Discharge criteria: Med titration    SHORT-TERM GOALS UPDATE:   Time frame for Short-Term Goals: 6 days    LONG-TERM GOALS UPDATE:   Time frame for Long-Term Goals: 6 days  Members Present in Team Meeting: See

## 2020-03-29 PROCEDURE — 6370000000 HC RX 637 (ALT 250 FOR IP): Performed by: INTERNAL MEDICINE

## 2020-03-29 PROCEDURE — 1240000000 HC EMOTIONAL WELLNESS R&B

## 2020-03-29 PROCEDURE — 99232 SBSQ HOSP IP/OBS MODERATE 35: CPT | Performed by: PSYCHIATRY & NEUROLOGY

## 2020-03-29 PROCEDURE — 6370000000 HC RX 637 (ALT 250 FOR IP): Performed by: NURSE PRACTITIONER

## 2020-03-29 RX ADMIN — ACETAMINOPHEN 650 MG: 325 TABLET ORAL at 18:15

## 2020-03-29 RX ADMIN — ASPIRIN 81 MG: 81 TABLET, COATED ORAL at 08:07

## 2020-03-29 RX ADMIN — TRAZODONE HYDROCHLORIDE 50 MG: 50 TABLET ORAL at 21:29

## 2020-03-29 RX ADMIN — DEXTROMETHORPHAN HYDROBROMIDE AND QUINIDINE SULFATE 1 CAPSULE: 20; 10 CAPSULE, GELATIN COATED ORAL at 08:07

## 2020-03-29 RX ADMIN — FAMOTIDINE 20 MG: 20 TABLET, FILM COATED ORAL at 08:07

## 2020-03-29 RX ADMIN — ATORVASTATIN CALCIUM 80 MG: 40 TABLET, FILM COATED ORAL at 21:29

## 2020-03-29 RX ADMIN — LEVOTHYROXINE SODIUM 100 MCG: 100 TABLET ORAL at 08:06

## 2020-03-29 RX ADMIN — CITALOPRAM HYDROBROMIDE 20 MG: 20 TABLET ORAL at 08:07

## 2020-03-29 RX ADMIN — DIVALPROEX SODIUM 500 MG: 500 TABLET, EXTENDED RELEASE ORAL at 08:06

## 2020-03-29 RX ADMIN — ACETAMINOPHEN 650 MG: 325 TABLET ORAL at 08:07

## 2020-03-29 ASSESSMENT — PAIN SCALES - GENERAL
PAINLEVEL_OUTOF10: 0
PAINLEVEL_OUTOF10: 7
PAINLEVEL_OUTOF10: 0
PAINLEVEL_OUTOF10: 1

## 2020-03-29 ASSESSMENT — PAIN DESCRIPTION - FREQUENCY
FREQUENCY: INTERMITTENT
FREQUENCY: CONTINUOUS

## 2020-03-29 ASSESSMENT — PAIN DESCRIPTION - PROGRESSION
CLINICAL_PROGRESSION: NOT CHANGED
CLINICAL_PROGRESSION: GRADUALLY WORSENING

## 2020-03-29 ASSESSMENT — PAIN DESCRIPTION - PAIN TYPE
TYPE: ACUTE PAIN
TYPE: CHRONIC PAIN

## 2020-03-29 ASSESSMENT — PAIN DESCRIPTION - LOCATION
LOCATION: LEG;ARM
LOCATION: ABDOMEN

## 2020-03-29 ASSESSMENT — PAIN - FUNCTIONAL ASSESSMENT
PAIN_FUNCTIONAL_ASSESSMENT: ACTIVITIES ARE NOT PREVENTED
PAIN_FUNCTIONAL_ASSESSMENT: PREVENTS OR INTERFERES SOME ACTIVE ACTIVITIES AND ADLS

## 2020-03-29 ASSESSMENT — PAIN DESCRIPTION - ORIENTATION
ORIENTATION: RIGHT
ORIENTATION: LOWER

## 2020-03-29 ASSESSMENT — PAIN DESCRIPTION - DESCRIPTORS
DESCRIPTORS: CRAMPING
DESCRIPTORS: PATIENT UNABLE TO DESCRIBE

## 2020-03-29 ASSESSMENT — PAIN DESCRIPTION - ONSET
ONSET: ON-GOING
ONSET: GRADUAL

## 2020-03-29 NOTE — PROGRESS NOTES
Dr. Ric Anaya here to see pt and review UA results. Per Dr. Ric Anaya, no need for ATB at this time. Encourage fluids, pt aware of POC.
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
## 2020-03-29 NOTE — GROUP NOTE
Group Therapy Note    Date: 3/29/2020    Group Start Time: 0830  Group End Time: 0900  Group Topic: Community Meeting/ AM [address] [facility]    Claudius Galeazzi, MSW, NADIA    Group Therapy Note    Attendees: 4/6       Patient's Goal: \"I want to do some things today\"    Notes: Patient actively participated in group. Patient stated that she was feeling \"beautiful, good\" and grateful for \"[place]a [place]\". Patient reported getting restful sleep last night. Patient rated her depression, anxiety, irritability/agitation as \"beautiful\", on a scale of 0 to 10. Reviewed goal, meal selection, and unit schedule for the day. Status After Intervention:  Improved    Participation Level:  Active Listener and Interactive    Participation Quality: Appropriate and Sharing    Speech:  Normal with difficulty finding the word she wanted to say    Thought Process/Content: Linear    Affective Functioning: Congruent    Mood: unchanged    Level of consciousness:  Alert and Attentive    Response to Learning: Able to verbalize current knowledge/experience    Endings: None Reported    Modes of Intervention: Exploration and Clarifying    Discipline Responsible: /Counselor    Signature: Claudius Galeazzi, MSW, LSW
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
## 2020-03-29 NOTE — PROGRESS NOTES
Pt alert to person during the shift. Pt with expressive aphasia so orientation difficult to determine. Pt cooperative with meds and assessment. Pt independently transfer from w/c to couch w/o difficulty. Pt continent, tolerating fluids and meals. Pt denies SI,HI, or AVH.

## 2020-03-30 LAB
BACTERIA: ABNORMAL /HPF
BILIRUBIN URINE: NEGATIVE MG/DL
BLOOD, URINE: ABNORMAL
CAST TYPE: NEGATIVE /HPF
CLARITY: ABNORMAL
COLOR: YELLOW
CRYSTAL TYPE: NEGATIVE /HPF
EPITHELIAL CELLS, UA: ABNORMAL /HPF
GLUCOSE, URINE: NEGATIVE MG/DL
KETONES, URINE: NEGATIVE MG/DL
LEUKOCYTE ESTERASE, URINE: ABNORMAL
NITRITE URINE, QUANTITATIVE: NEGATIVE
PH, URINE: 6 (ref 5–8)
PROTEIN UA: 100 MG/DL
RBC URINE: ABNORMAL /HPF (ref 0–6)
SPECIFIC GRAVITY UA: 1.03 (ref 1–1.03)
UROBILINOGEN, URINE: 0.2 MG/DL (ref 0.2–1)
WBC UA: ABNORMAL /HPF (ref 0–5)

## 2020-03-30 PROCEDURE — 87086 URINE CULTURE/COLONY COUNT: CPT

## 2020-03-30 PROCEDURE — 81001 URINALYSIS AUTO W/SCOPE: CPT

## 2020-03-30 PROCEDURE — 97530 THERAPEUTIC ACTIVITIES: CPT

## 2020-03-30 PROCEDURE — 87077 CULTURE AEROBIC IDENTIFY: CPT

## 2020-03-30 PROCEDURE — 6370000000 HC RX 637 (ALT 250 FOR IP): Performed by: INTERNAL MEDICINE

## 2020-03-30 PROCEDURE — 97112 NEUROMUSCULAR REEDUCATION: CPT

## 2020-03-30 PROCEDURE — 97110 THERAPEUTIC EXERCISES: CPT

## 2020-03-30 PROCEDURE — 87186 SC STD MICRODIL/AGAR DIL: CPT

## 2020-03-30 PROCEDURE — 1240000000 HC EMOTIONAL WELLNESS R&B

## 2020-03-30 PROCEDURE — 6370000000 HC RX 637 (ALT 250 FOR IP): Performed by: NURSE PRACTITIONER

## 2020-03-30 RX ADMIN — LEVOTHYROXINE SODIUM 100 MCG: 100 TABLET ORAL at 07:44

## 2020-03-30 RX ADMIN — DIVALPROEX SODIUM 500 MG: 500 TABLET, EXTENDED RELEASE ORAL at 07:43

## 2020-03-30 RX ADMIN — DEXTROMETHORPHAN HYDROBROMIDE AND QUINIDINE SULFATE 1 CAPSULE: 20; 10 CAPSULE, GELATIN COATED ORAL at 07:44

## 2020-03-30 RX ADMIN — ACETAMINOPHEN 650 MG: 325 TABLET ORAL at 09:45

## 2020-03-30 RX ADMIN — ATORVASTATIN CALCIUM 80 MG: 40 TABLET, FILM COATED ORAL at 20:39

## 2020-03-30 RX ADMIN — CITALOPRAM HYDROBROMIDE 20 MG: 20 TABLET ORAL at 07:44

## 2020-03-30 RX ADMIN — ASPIRIN 81 MG: 81 TABLET, COATED ORAL at 07:44

## 2020-03-30 RX ADMIN — ACETAMINOPHEN 650 MG: 325 TABLET ORAL at 17:59

## 2020-03-30 RX ADMIN — TRAZODONE HYDROCHLORIDE 50 MG: 50 TABLET ORAL at 20:39

## 2020-03-30 RX ADMIN — FAMOTIDINE 20 MG: 20 TABLET, FILM COATED ORAL at 07:44

## 2020-03-30 ASSESSMENT — PAIN DESCRIPTION - FREQUENCY
FREQUENCY: INTERMITTENT

## 2020-03-30 ASSESSMENT — PAIN SCALES - GENERAL
PAINLEVEL_OUTOF10: 8
PAINLEVEL_OUTOF10: 0
PAINLEVEL_OUTOF10: 4
PAINLEVEL_OUTOF10: 7
PAINLEVEL_OUTOF10: 3

## 2020-03-30 ASSESSMENT — PAIN DESCRIPTION - PAIN TYPE
TYPE: ACUTE PAIN

## 2020-03-30 ASSESSMENT — PAIN DESCRIPTION - ORIENTATION
ORIENTATION: MID;LOWER

## 2020-03-30 ASSESSMENT — PAIN DESCRIPTION - LOCATION
LOCATION: ABDOMEN

## 2020-03-30 ASSESSMENT — PAIN DESCRIPTION - PROGRESSION
CLINICAL_PROGRESSION: GRADUALLY WORSENING
CLINICAL_PROGRESSION: GRADUALLY IMPROVING
CLINICAL_PROGRESSION: GRADUALLY WORSENING

## 2020-03-30 ASSESSMENT — PAIN SCALES - WONG BAKER
WONGBAKER_NUMERICALRESPONSE: 8
WONGBAKER_NUMERICALRESPONSE: 4

## 2020-03-30 ASSESSMENT — PAIN DESCRIPTION - ONSET
ONSET: UNABLE TO TELL

## 2020-03-30 ASSESSMENT — PAIN DESCRIPTION - DESCRIPTORS
DESCRIPTORS: PATIENT UNABLE TO DESCRIBE

## 2020-03-30 ASSESSMENT — PAIN - FUNCTIONAL ASSESSMENT
PAIN_FUNCTIONAL_ASSESSMENT: ACTIVITIES ARE NOT PREVENTED

## 2020-03-30 NOTE — GROUP NOTE
Group Therapy Note    Date: 3/30/2020    Group Start Time: 8631  Group End Time: 1600  Group Topic: Recreational    [address] [place], [place]        Group Therapy Note    Attendees: 3/5       Notes:  Pts participated in recreational therapy group; Bingo Activity. Pts were encouraged to engage in a leisure activity to promote socialization, positive mood, and coping with negative emotions. Pt sleeping and allowed to continue to rest at this time.        Discipline Responsible: Certified Therapeutic Recreation Specialist       Signature:  Sommer Tabor [place], [place]

## 2020-03-30 NOTE — PROGRESS NOTES
Physical Therapy    Physical Therapy Treatment Note  Name: Cristian Ayoub MRN: [id number] :   1945   Date:  3/30/2020   Admission Date: 3/26/2020 Room:  1048/1048-01   Restrictions/Precautions:  Restrictions/Precautions  Restrictions/Precautions: General Precautions, Fall Risk  Required Braces or Orthoses?: Yes       Communication with other providers:  Co-treat OT  Subjective:  Patient states: \"Thank you\"  Pain:   Location, Type, Intensity (0/10 to 10/10):  5/10 lower abdomen  Objective:    Observation:  P sitting up in main room. Treatment, including education/measures:  Transfers: P performs stand pivot from couch to chair with supervision. P able to lock/unlock wc appropriately. P resistant to physical assist for safety and demos decreased weight bearing through R foot with activity. P performs sit <> stand x 5 reps during session with mod cues for right foot placement and increased time to correct. P stands with R foot in stride stance to L foot. Weight bearing improved with min A through foot to promote weight shift. Functional mobility: P performs standing activity without UE support to promote stability and weight bearing through RLE. P stands x 4 min, 7 min with 1 seated rest break and supervision in standing. P reaching with LUE within BENITO during activity. P performs 7 reps standing mini-squats requiring max verbal and visual cues. P limited in understanding requiring increased time to perform each activity. P educated on safety with transfers and importance of foot positioning. P left sitting in wc to participate in group therapy. Assessment / Impression:    P demos improved static standing. P with decreased weight bearing through RLE resulting in decreased stability. Recommend continued skilled PT to address deficits in strength, balance, and gait.   Patient's tolerance of treatment:  good   Adverse Reaction: pain  Significant change in status and impact:  Improved static balance  Barriers
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
## 2020-03-30 NOTE — PROGRESS NOTES
Pt tearful and rubbing lower abdomen. When asked if her stomach was painful she said yes. Denies any diarrhea, but shakes head yes when asked if urination is painful. Urine clean catch gathered and sent to lab for urinalysis and culture. Tylenol given for pain.

## 2020-03-30 NOTE — BH NOTE
Patient pleasant and cooperative with assessment, alert to self and time only. Patient affect appears flat when sitting independently, patient is bright and friendly when interacting with staff. Patient denies SI/HI/AVH, c/o mild pain in right sided extremities, relieved by rest and repositioning. Patient utilizes wheelchair, is able to propel self on unit and transfer in and out of wheelchair with little or no staff assistance. Patient compliant with HS medications crushed in applesauce. Patient rested quietly through the night, awake x2 for toileting, staff assisted patient on and off bedside commode. Patient has slept approximately 8 hours at this time.

## 2020-03-31 PROCEDURE — 1240000000 HC EMOTIONAL WELLNESS R&B

## 2020-03-31 PROCEDURE — 97110 THERAPEUTIC EXERCISES: CPT

## 2020-03-31 PROCEDURE — 6370000000 HC RX 637 (ALT 250 FOR IP): Performed by: NURSE PRACTITIONER

## 2020-03-31 PROCEDURE — 6370000000 HC RX 637 (ALT 250 FOR IP): Performed by: INTERNAL MEDICINE

## 2020-03-31 RX ADMIN — FAMOTIDINE 20 MG: 20 TABLET, FILM COATED ORAL at 08:02

## 2020-03-31 RX ADMIN — ASPIRIN 81 MG: 81 TABLET, COATED ORAL at 08:02

## 2020-03-31 RX ADMIN — CITALOPRAM HYDROBROMIDE 20 MG: 20 TABLET ORAL at 08:02

## 2020-03-31 RX ADMIN — DIVALPROEX SODIUM 500 MG: 500 TABLET, EXTENDED RELEASE ORAL at 08:02

## 2020-03-31 RX ADMIN — DEXTROMETHORPHAN HYDROBROMIDE AND QUINIDINE SULFATE 1 CAPSULE: 20; 10 CAPSULE, GELATIN COATED ORAL at 08:02

## 2020-03-31 RX ADMIN — ACETAMINOPHEN 650 MG: 325 TABLET ORAL at 13:55

## 2020-03-31 RX ADMIN — ACETAMINOPHEN 650 MG: 325 TABLET ORAL at 06:35

## 2020-03-31 RX ADMIN — TRAZODONE HYDROCHLORIDE 50 MG: 50 TABLET ORAL at 20:34

## 2020-03-31 RX ADMIN — LEVOTHYROXINE SODIUM 100 MCG: 100 TABLET ORAL at 08:02

## 2020-03-31 RX ADMIN — ATORVASTATIN CALCIUM 80 MG: 40 TABLET, FILM COATED ORAL at 20:34

## 2020-03-31 ASSESSMENT — PAIN SCALES - GENERAL
PAINLEVEL_OUTOF10: 7
PAINLEVEL_OUTOF10: 0
PAINLEVEL_OUTOF10: 10
PAINLEVEL_OUTOF10: 3
PAINLEVEL_OUTOF10: 3

## 2020-03-31 NOTE — PLAN OF CARE
Problem: Pain:  Goal: Pain level will decrease  Description: Pain level will decrease  3/31/2020 1415 by Quique Tipton RN  Outcome: Ongoing  3/31/2020 0112 by Reena Vasquez RN  Outcome: Ongoing  Goal: Control of acute pain  Description: Control of acute pain  3/31/2020 1415 by Quique Tipton RN  Outcome: Ongoing  3/31/2020 0112 by Reena Vasquez RN  Outcome: Ongoing  Goal: Control of chronic pain  Description: Control of chronic pain  3/31/2020 1415 by Quique Tipton RN  Outcome: Ongoing  3/31/2020 0112 by Reena Vasquez RN  Outcome: Ongoing     Problem: Falls - Risk of:  Goal: Will remain free from falls  Description: Will remain free from falls  3/31/2020 1415 by Quique Tipton RN  Outcome: Ongoing  3/31/2020 0112 by Reena Vasquez RN  Outcome: Ongoing  Goal: Absence of physical injury  Description: Absence of physical injury  3/31/2020 1415 by Quique Tipton RN  Outcome: Ongoing  3/31/2020 0112 by Reena Vasquez RN  Outcome: Ongoing     Problem: Depressive Behavior With or Without Suicide Precautions:  Goal: Able to verbalize acceptance of life and situations over which he or she has no control  Description: Able to verbalize acceptance of life and situations over which he or she has no control  3/31/2020 1415 by Quique Tipton RN  Outcome: Ongoing  3/31/2020 0112 by Reena Vasquez RN  Outcome: Ongoing  Goal: Able to verbalize and/or display a decrease in depressive symptoms  Description: Able to verbalize and/or display a decrease in depressive symptoms  3/31/2020 1415 by Quique Tipton RN  Outcome: Ongoing  3/31/2020 0112 by Reena Vasquez RN  Outcome: Ongoing  Goal: Ability to disclose and discuss suicidal ideas will improve  Description: Ability to disclose and discuss suicidal ideas will improve  3/31/2020 1415 by Quique Tipton RN  Outcome: Ongoing  3/31/2020 0112 by Reena Vasquez RN  Outcome: Ongoing  Goal: Able to verbalize support systems  Description: Able to verbalize support systems  3/31/2020 1415 by Héctor Leon RN  Outcome: Ongoing  3/31/2020 0112 by Ann Jara RN  Outcome: Ongoing  Goal: Absence of self-harm  Description: Absence of self-harm  3/31/2020 1415 by Héctor Leon RN  Outcome: Ongoing  3/31/2020 0112 by Ann Jara RN  Outcome: Ongoing  Goal: Patient specific goal  Description: Patient specific goal  3/31/2020 1415 by Héctor Leon RN  Outcome: Ongoing  3/31/2020 0112 by Ann Jara RN  Outcome: Ongoing  Goal: Participates in care planning  Description: Participates in care planning  3/31/2020 1415 by Héctor Leon RN  Outcome: Ongoing  3/31/2020 0112 by Ann Jara RN  Outcome: Ongoing     Problem: Suicide risk  Goal: Provide patient with safe environment  Description: Provide patient with safe environment  3/31/2020 1415 by Héctor Leon RN  Outcome: Ongoing  3/31/2020 0112 by Ann Jara RN  Outcome: Ongoing

## 2020-03-31 NOTE — GROUP NOTE
Group Therapy Note    Date: 3/31/2020    Group Start Time: 1300  Group End Time: 7138    Number of Participants: 5/7    Type: Exercise/Recreation Group    Group Topic/Objective: Self-Care Discussion    Notes:  Pt attended group, but did not participate in the discussion. Pt was noted to nod her head in agreement in appropriate places. Status After Intervention:  Improved    Participation Level:  Active Listener    Participation Quality: Appropriate and Attentive    Speech:  normal    Thought Process/Content: Logical    Affective Functioning: Congruent    Mood: Bright    Level of consciousness:  Alert and Attentive    Response to Learning: Able to verbalize current knowledge/experience    Endings: None Reported    Modes of Intervention: Education, Support and Socialization    Discipline Responsible: Certified Therapeutic Recreation Specialist     Electronically signed by Micahel Chambers MA on 3/31/2020 at 1:54 PM

## 2020-03-31 NOTE — PROGRESS NOTES
strength, Rt arm stretching, dynamic standing balance, and functional activity tolerance.    Short term goal 5: Pt will complete all aspects of toileting mod I.
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
## 2020-03-31 NOTE — BH NOTE
Sarah Beth Worley rested on her bed at the beginning of this shift then she came out on the milieu. She was polite and cooperative with vitals. Medication was reviewed and was put in yogurt. She was not sure if it would help her but stated she would try. She was easily compliant. She smiled and was very thankful for everything that was done or her. She denied SI, depression,anxiety and agitation. She stated that she felt absolutely beautiful. She reported feeling grateful for her grandson and granddaughter, Nadira Easley and Magalys Ambriz. Will continue to monitor for safety.

## 2020-03-31 NOTE — GROUP NOTE
Group Therapy Note    Date: 3/31/2020    Group Start Time: 0607  Group End Time: 7437  Group Topic: Psychoeducation    [address] [facility]    PRINCE Santos, MA        Group Therapy Note    Attendees: 4/7       Notes:  Pts participated in recreational therapy group; Relaxation Through Music. Pts were each allowed to pick a song they could listen to that will help them relax. Pts were encouraged to relax and socialize as the music was playing. Pt attended group, but struggled to follow 1 step instructions. Pt unable to follow multiple prompts to pick songs and instead would start singing. Status After Intervention:  Unchanged    Participation Level: Interactive    Participation Quality: Sharing      Speech:  normal      Thought Process/Content: Pt struggled to follow 1 step directions.        Affective Functioning: Blunted      Mood: Blunted      Level of consciousness:  Preoccupied      Response to Learning: Able to verbalize current knowledge/experience      Endings: None Reported    Modes of Intervention: Socialization, Exploration and Activity      Discipline Responsible: Certified Therapeutic Recreation Specialist       Signature:  Leo Santos, [address] [place] [address] Floridalma Velásquez

## 2020-03-31 NOTE — PLAN OF CARE
Problem: Pain:  Goal: Pain level will decrease  Description: Pain level will decrease  Outcome: Ongoing  Goal: Control of acute pain  Description: Control of acute pain  Outcome: Ongoing  Goal: Control of chronic pain  Description: Control of chronic pain  Outcome: Ongoing     Problem: Falls - Risk of:  Goal: Will remain free from falls  Description: Will remain free from falls  Outcome: Ongoing  Goal: Absence of physical injury  Description: Absence of physical injury  Outcome: Ongoing     Problem: Depressive Behavior With or Without Suicide Precautions:  Goal: Able to verbalize acceptance of life and situations over which he or she has no control  Description: Able to verbalize acceptance of life and situations over which he or she has no control  Outcome: Ongoing  Goal: Able to verbalize and/or display a decrease in depressive symptoms  Description: Able to verbalize and/or display a decrease in depressive symptoms  Outcome: Ongoing  Goal: Ability to disclose and discuss suicidal ideas will improve  Description: Ability to disclose and discuss suicidal ideas will improve  Outcome: Ongoing  Goal: Able to verbalize support systems  Description: Able to verbalize support systems  Outcome: Ongoing  Goal: Absence of self-harm  Description: Absence of self-harm  Outcome: Ongoing  Goal: Patient specific goal  Description: Patient specific goal  Outcome: Ongoing  Goal: Participates in care planning  Description: Participates in care planning  Outcome: Ongoing     Problem: Suicide risk  Goal: Provide patient with safe environment  Description: Provide patient with safe environment  Outcome: Ongoing

## 2020-04-01 PROCEDURE — 97530 THERAPEUTIC ACTIVITIES: CPT

## 2020-04-01 PROCEDURE — 6370000000 HC RX 637 (ALT 250 FOR IP): Performed by: NURSE PRACTITIONER

## 2020-04-01 PROCEDURE — 1240000000 HC EMOTIONAL WELLNESS R&B

## 2020-04-01 PROCEDURE — 6370000000 HC RX 637 (ALT 250 FOR IP): Performed by: INTERNAL MEDICINE

## 2020-04-01 PROCEDURE — 97110 THERAPEUTIC EXERCISES: CPT

## 2020-04-01 RX ADMIN — ATORVASTATIN CALCIUM 80 MG: 40 TABLET, FILM COATED ORAL at 20:48

## 2020-04-01 RX ADMIN — LEVOTHYROXINE SODIUM 100 MCG: 100 TABLET ORAL at 08:11

## 2020-04-01 RX ADMIN — ASPIRIN 81 MG: 81 TABLET, COATED ORAL at 08:11

## 2020-04-01 RX ADMIN — CITALOPRAM HYDROBROMIDE 20 MG: 20 TABLET ORAL at 08:11

## 2020-04-01 RX ADMIN — TRAZODONE HYDROCHLORIDE 50 MG: 50 TABLET ORAL at 20:48

## 2020-04-01 RX ADMIN — FAMOTIDINE 20 MG: 20 TABLET, FILM COATED ORAL at 08:11

## 2020-04-01 RX ADMIN — DIVALPROEX SODIUM 500 MG: 500 TABLET, EXTENDED RELEASE ORAL at 08:11

## 2020-04-01 RX ADMIN — DEXTROMETHORPHAN HYDROBROMIDE AND QUINIDINE SULFATE 1 CAPSULE: 20; 10 CAPSULE, GELATIN COATED ORAL at 08:11

## 2020-04-01 ASSESSMENT — PAIN SCALES - GENERAL: PAINLEVEL_OUTOF10: 0

## 2020-04-01 NOTE — PROGRESS NOTES
Pt remains cooperative throughout the shift. Pt tolerating fluids and meals. Pt denies SI, HI or AVH. Spoke with pt re: skilled rehab. Pt expresses a desire to work more on strengthening her R side. This RN encouraged pt to consider going to rehab from [facility] to get stronger prior to going home.

## 2020-04-01 NOTE — GROUP NOTE
Group Therapy Note    Date: 4/1/2020    Group Start Time: 0830  Group End Time: 0900    Number of Participants: 4/7    Type: Morning Goals Group/ Community Meeting    Group Topic/Objective: Set Goal For The Day and to review Unit Rules and Regulations. Patient's Goal:  Pt states \"Piano. \"    Notes:  Pt attended the last ~5 minutes of group. Pt states she is feeling \"beautiful\" and is grateful for Lore Broody. \"  Pt reports restful sleep, but unable to give a number for how many hours she slept.      Depression (0-10): 0    Anxiety (0-10): 0    Irritability/Aggitation (0-10): 0    Status After Intervention:  Improved    Participation Level: Interactive    Participation Quality: Appropriate, Attentive and Sharing    Speech:  normal    Thought Process/Content: Logical    Affective Functioning: Congruent    Mood: Bright    Level of consciousness:  Alert and Attentive    Response to Learning: Able to verbalize current knowledge/experience    Endings: None Reported    Modes of Intervention: Education, Support and Socialization    Discipline Responsible: Certified Therapeutic Recreation Specialist     Electronically signed by Noel Javier0 MINA Jillian  MA on 4/1/2020 at 9:19 AM

## 2020-04-01 NOTE — PROGRESS NOTES
No  Mode of Transportation: Family, Friends  Occupation: Unemployed  Additional Comments: Information gathered from chart and pt. Pt had difficulty with communication. Pt reports she uses cane to walk. Short term goals  Time Frame for Short term goals: 1 week or until discharge  Short term goal 1: P will perform bed mobility with Amie. Short term goal 2: P will perform stand pivot transfer with supervision demonstrating improved safety and control. Short term goal 3: P will perform sit <> stand to wall rail with supervision. Short term goal 4: P will ambulate x 20' with wall rail if AFO and footwear can be obtained demonstrating improved mobility and function.        Electronically signed by:    Bipin Trujillo, PT  4/1/2020, 1:04 PM

## 2020-04-01 NOTE — GROUP NOTE
Group Therapy Note    Date: 4/1/2020    Group Start Time: 1350  Group End Time: 0277    Number of Participants: 5/7    Type: Exercise/Recreation Group    Group Topic/Objective: Chair Exercises    Notes:  Pt attended group, but noted to be sleeping the entire time in group. Status After Intervention:  Unchanged    Participation Level: None    Participation Quality: Lethargic    Speech:  normal    Thought Process/Content: Logical    Affective Functioning: Pt sleeping. Mood: Pt sleeping. Level of consciousness:  Drowsy    Response to Learning: Pt unable to demonstrate response to learning at this time d/t lethargy.     Endings: None Reported    Modes of Intervention: Activity and Movement    Discipline Responsible: Certified Therapeutic Recreation Specialist     Electronically signed by Caroline Javier [address] [place] [address], MA on 4/1/2020 at 2:23 PM

## 2020-04-01 NOTE — PLAN OF CARE
UPDATE:  Time frame for Long-Term Goals: 5 days   Members Present in Team Meeting: See Esteban Crocker, MSW, LSW

## 2020-04-01 NOTE — PLAN OF CARE
environment  Description: Provide patient with safe environment  4/1/2020 0233 by Gerard Stewart RN  Outcome: Ongoing  3/31/2020 1415 by Reji Hollingsworth RN  Outcome: Ongoing

## 2020-04-01 NOTE — BH NOTE
Group Therapy Note    Date: 3/31/2020  Start Time: 1930  End Time:  2000  Number of Participants: 1      Type of Group: Nursing Education      Notes:  Education provided on medication uses and side effects    Status After Intervention:  Improved    Participation Level: Interactive    Participation Quality: Appropriate      Speech:  Expressive aphasia      Thought Process/Content: Circumstantial      Affective Functioning: Congruent      Mood: elevated      Level of consciousness:  Alert      Response to Learning: Progressing to goal      Endings: None Reported    Modes of Intervention: Education      Discipline Responsible: Registered Nurse      Signature:  Ryanne Corbin RN

## 2020-04-02 PROBLEM — N30.00 ACUTE CYSTITIS WITHOUT HEMATURIA: Status: ACTIVE | Noted: 2020-04-02

## 2020-04-02 LAB
CULTURE: ABNORMAL
Lab: ABNORMAL
SPECIMEN: ABNORMAL
TOTAL COLONY COUNT: ABNORMAL

## 2020-04-02 PROCEDURE — 6370000000 HC RX 637 (ALT 250 FOR IP): Performed by: INTERNAL MEDICINE

## 2020-04-02 PROCEDURE — 6370000000 HC RX 637 (ALT 250 FOR IP): Performed by: PSYCHIATRY & NEUROLOGY

## 2020-04-02 PROCEDURE — 6370000000 HC RX 637 (ALT 250 FOR IP): Performed by: NURSE PRACTITIONER

## 2020-04-02 PROCEDURE — 1240000000 HC EMOTIONAL WELLNESS R&B

## 2020-04-02 PROCEDURE — 99233 SBSQ HOSP IP/OBS HIGH 50: CPT | Performed by: NURSE PRACTITIONER

## 2020-04-02 PROCEDURE — 97110 THERAPEUTIC EXERCISES: CPT

## 2020-04-02 PROCEDURE — 97530 THERAPEUTIC ACTIVITIES: CPT

## 2020-04-02 RX ORDER — CIPROFLOXACIN 500 MG/1
500 TABLET, FILM COATED ORAL
Status: DISCONTINUED | OUTPATIENT
Start: 2020-04-02 | End: 2020-04-03 | Stop reason: HOSPADM

## 2020-04-02 RX ADMIN — LORAZEPAM 1 MG: 1 TABLET ORAL at 20:21

## 2020-04-02 RX ADMIN — ACETAMINOPHEN 500 MG: 500 TABLET ORAL at 20:22

## 2020-04-02 RX ADMIN — DEXTROMETHORPHAN HYDROBROMIDE AND QUINIDINE SULFATE 1 CAPSULE: 20; 10 CAPSULE, GELATIN COATED ORAL at 08:21

## 2020-04-02 RX ADMIN — TRAZODONE HYDROCHLORIDE 50 MG: 50 TABLET ORAL at 20:21

## 2020-04-02 RX ADMIN — FAMOTIDINE 20 MG: 20 TABLET, FILM COATED ORAL at 08:21

## 2020-04-02 RX ADMIN — ASPIRIN 81 MG: 81 TABLET, COATED ORAL at 08:20

## 2020-04-02 RX ADMIN — CITALOPRAM HYDROBROMIDE 20 MG: 20 TABLET ORAL at 08:21

## 2020-04-02 RX ADMIN — ATORVASTATIN CALCIUM 80 MG: 40 TABLET, FILM COATED ORAL at 20:21

## 2020-04-02 RX ADMIN — CIPROFLOXACIN HYDROCHLORIDE 500 MG: 500 TABLET, FILM COATED ORAL at 16:42

## 2020-04-02 RX ADMIN — LEVOTHYROXINE SODIUM 100 MCG: 100 TABLET ORAL at 08:20

## 2020-04-02 RX ADMIN — CIPROFLOXACIN HYDROCHLORIDE 500 MG: 500 TABLET, FILM COATED ORAL at 11:54

## 2020-04-02 RX ADMIN — DIVALPROEX SODIUM 500 MG: 500 TABLET, EXTENDED RELEASE ORAL at 08:20

## 2020-04-02 ASSESSMENT — PAIN - FUNCTIONAL ASSESSMENT: PAIN_FUNCTIONAL_ASSESSMENT: PREVENTS OR INTERFERES SOME ACTIVE ACTIVITIES AND ADLS

## 2020-04-02 ASSESSMENT — PAIN DESCRIPTION - ORIENTATION: ORIENTATION: RIGHT

## 2020-04-02 ASSESSMENT — PAIN SCALES - GENERAL
PAINLEVEL_OUTOF10: 5
PAINLEVEL_OUTOF10: 0

## 2020-04-02 ASSESSMENT — PAIN DESCRIPTION - LOCATION: LOCATION: ARM;LEG

## 2020-04-02 ASSESSMENT — PAIN DESCRIPTION - DESCRIPTORS: DESCRIPTORS: PATIENT UNABLE TO DESCRIBE

## 2020-04-02 ASSESSMENT — PAIN DESCRIPTION - PAIN TYPE: TYPE: CHRONIC PAIN

## 2020-04-02 ASSESSMENT — PAIN DESCRIPTION - FREQUENCY: FREQUENCY: INTERMITTENT

## 2020-04-02 ASSESSMENT — PAIN DESCRIPTION - ONSET: ONSET: UNABLE TO TELL

## 2020-04-02 NOTE — PLAN OF CARE
Problem: Pain:  Goal: Pain level will decrease  Description: Pain level will decrease  Outcome: Ongoing  Goal: Control of chronic pain  Description: Control of chronic pain  Outcome: Ongoing     Problem: Falls - Risk of:  Goal: Will remain free from falls  Description: Will remain free from falls  Outcome: Ongoing  Goal: Absence of physical injury  Description: Absence of physical injury  Outcome: Ongoing     Problem: Depressive Behavior With or Without Suicide Precautions:  Goal: Able to verbalize acceptance of life and situations over which he or she has no control  Description: Able to verbalize acceptance of life and situations over which he or she has no control  Outcome: Ongoing  Goal: Able to verbalize and/or display a decrease in depressive symptoms  Description: Able to verbalize and/or display a decrease in depressive symptoms  Outcome: Ongoing  Goal: Able to verbalize support systems  Description: Able to verbalize support systems  Outcome: Ongoing  Goal: Absence of self-harm  Description: Absence of self-harm  Outcome: Ongoing  Goal: Patient specific goal  Description: Patient specific goal  Outcome: Ongoing  Goal: Participates in care planning  Description: Participates in care planning  Outcome: Ongoing     Problem: Suicide risk  Goal: Provide patient with safe environment  Description: Provide patient with safe environment  Outcome: Ongoing

## 2020-04-02 NOTE — PLAN OF CARE
1003 by Tianna Marquez RN  Outcome: Ongoing  4/2/2020 0112 by Ryanne Corbin RN  Outcome: Ongoing  Goal: Patient specific goal  Description: Patient specific goal  4/2/2020 1003 by Tianna Marquez RN  Outcome: Ongoing  4/2/2020 0112 by Ryanne Corbin RN  Outcome: Ongoing  Goal: Participates in care planning  Description: Participates in care planning  4/2/2020 1003 by Tianna Marquez RN  Outcome: Ongoing  4/2/2020 0112 by Ryanne Corbin RN  Outcome: Ongoing     Problem: Suicide risk  Goal: Provide patient with safe environment  Description: Provide patient with safe environment  4/2/2020 1003 by Tianna Marquez RN  Outcome: Ongoing  4/2/2020 0112 by Ryanne Corbin RN  Outcome: Ongoing

## 2020-04-02 NOTE — GROUP NOTE
Group Therapy Note    Date: 4/2/2020    Group Start Time: 0830  Group End Time: 7939    Number of Participants: 5/7    Type: Morning Goals Group/ Community Meeting    Group Topic/Objective: Set Goal For The Day and to review Unit Rules and Regulations. Patient's Goal:  Pt reports her goal is to see her family. Notes:  Pt arrived ~15 minutes late to group. Pt required frequent cuing to stop touching male pt on face and chest. Pt states she feels \"beautiful\" and is grateful for Rajni Bob, and [place]. \"  Pt reports ~8 hours of restful sleep.      Depression (0-10): 0    Anxiety (0-10): 0    Irritability/Aggitation (0-10): 0    Status After Intervention:  Improved    Participation Level: Interactive    Participation Quality: Attentive, Sharing and Inappropriate    Speech:  pressured    Thought Process/Content: Logical    Affective Functioning: Congruent    Mood: Bright    Level of consciousness:  Preoccupied    Response to Learning: Able to verbalize current knowledge/experience    Endings: None Reported    Modes of Intervention: Education, Support and Socialization    Discipline Responsible: Certified Therapeutic Recreation Specialist     Electronically signed by Luis King MA on 4/2/2020 at 9:26 AM

## 2020-04-02 NOTE — PROGRESS NOTES
intact. Motor is 5 out of 5 bilaterally. Sensory is intact  SKIN:  no bruising or bleeding, normal skin color, texture, turgor and no redness, warmth, or swelling    No results found.-    DATA:    CBC No results for input(s): WBC, HGB, HCT, PLT in the last 72 hours. BMP No results for input(s): NA, K, CL, CO2, PHOS, BUN, CREATININE in the last 72 hours. Invalid input(s): CA  LFT'S No results for input(s): AST, ALT, ALB, BILIDIR, BILITOT, ALKPHOS in the last 72 hours. COAG No results for input(s): INR in the last 72 hours. CARDIAC ENZYMES  No results for input(s): CKTOTAL, CKMB, CKMBINDEX, TROPONINI in the last 72 hours.   U/A:    Lab Results   Component Value Date    NITRITE neg 11/08/2017    COLORU YELLOW 03/30/2020    WBCUA 34 TO 40 03/30/2020    WBCUA 11-20 12/18/2017    RBCUA 6 TO 9 03/30/2020    MUCUS RARE 03/25/2020    BACTERIA MANY 03/30/2020    CLARITYU CLOUDY 03/30/2020    SPECGRAV 1.032 03/30/2020    LEUKOCYTESUR MODERATE 03/30/2020    BLOODU LARGE 03/30/2020    GLUCOSEU Negative 12/18/2017         Janett Limon MD  Rounding Hospitalist

## 2020-04-02 NOTE — GROUP NOTE
Group Therapy Note    Date: 4/2/2020    Group Start Time: 1100  Group End Time: 1130  Group Topic: Psychotherapy    [address] [facility]    JENNA Carpenter LSW        Group Therapy Note    Attendees: 5/6     Patient's Goal:  Topic was coping in crisis. Notes:  Patient was active and participated in group.     Status After Intervention:  Improved    Participation Level: Interactive    Participation Quality: Appropriate    Speech:  normal    Thought Process/Content: Linear    Affective Functioning: Congruent    Mood: elevated    Level of consciousness:  Attentive    Response to Learning: Able to verbalize/acknowledge new learning    Endings: None Reported    Modes of Intervention: Education and Support    Discipline Responsible: /Counselor      Signature:  JENNA Carpenter LSW
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
## 2020-04-02 NOTE — PROGRESS NOTES
w/c to prevent fall. Assessment / Impression:      Patient's tolerance of treatment:  WFL   Significant change in status and impact:  improving  Barriers to improvement:  Aphasia, hypertonic R limbs, AFO not present in [facility] for gait      Safety: Left in w/c with all needs in reach. Time in:  1037  Time out:  1100  Timed treatment minutes:  23  Total treatment time:  23    Electronically signed by: RICO Schofield/L, [facility]   [id number]   11:25 AM, 4/2/2020      Previously filed values:     Short term goals  Time Frame for Short term goals: at discharge  Short term goal 1: Pt will complete all UB ADLs min A with use of AE PRN  Short term goal 2: Pt will complete all LB ADLs min A with use of AE PRN  Short term goal 3: pt will complete functional transfers mod I   Short term goal 4: Pt will participate in therex/act with emphasis on Lt arm strength, Rt arm stretching, dynamic standing balance, and functional activity tolerance.    Short term goal 5: Pt will complete all aspects of toileting mod I.

## 2020-04-03 VITALS
HEART RATE: 76 BPM | SYSTOLIC BLOOD PRESSURE: 119 MMHG | TEMPERATURE: 98 F | DIASTOLIC BLOOD PRESSURE: 66 MMHG | RESPIRATION RATE: 16 BRPM | BODY MASS INDEX: 28.16 KG/M2 | OXYGEN SATURATION: 99 % | HEIGHT: 62 IN | WEIGHT: 153 LBS

## 2020-04-03 PROBLEM — R46.89 AGGRESSIVE BEHAVIOR: Chronic | Status: RESOLVED | Noted: 2020-03-26 | Resolved: 2020-04-03

## 2020-04-03 PROBLEM — R45.851 SUICIDAL IDEATION: Chronic | Status: RESOLVED | Noted: 2020-03-26 | Resolved: 2020-04-03

## 2020-04-03 PROBLEM — R45.850 HOMICIDAL IDEATION: Chronic | Status: RESOLVED | Noted: 2020-03-26 | Resolved: 2020-04-03

## 2020-04-03 PROCEDURE — 6370000000 HC RX 637 (ALT 250 FOR IP): Performed by: NURSE PRACTITIONER

## 2020-04-03 PROCEDURE — 6370000000 HC RX 637 (ALT 250 FOR IP): Performed by: INTERNAL MEDICINE

## 2020-04-03 PROCEDURE — 99239 HOSP IP/OBS DSCHRG MGMT >30: CPT | Performed by: NURSE PRACTITIONER

## 2020-04-03 RX ORDER — CIPROFLOXACIN 500 MG/1
500 TABLET, FILM COATED ORAL
Qty: 20 TABLET | Refills: 0 | Status: SHIPPED | OUTPATIENT
Start: 2020-04-03 | End: 2020-04-13

## 2020-04-03 RX ORDER — CIPROFLOXACIN 500 MG/1
500 TABLET, FILM COATED ORAL
Qty: 20 TABLET | Refills: 0 | Status: SHIPPED | OUTPATIENT
Start: 2020-04-03 | End: 2020-04-03

## 2020-04-03 RX ORDER — DIVALPROEX SODIUM 500 MG/1
500 TABLET, EXTENDED RELEASE ORAL DAILY
Qty: 30 TABLET | Refills: 2 | Status: SHIPPED | OUTPATIENT
Start: 2020-04-04 | End: 2020-04-03

## 2020-04-03 RX ORDER — TRAZODONE HYDROCHLORIDE 50 MG/1
50 TABLET ORAL NIGHTLY
Qty: 30 TABLET | Refills: 2 | Status: SHIPPED | OUTPATIENT
Start: 2020-04-03 | End: 2020-04-17

## 2020-04-03 RX ORDER — DIVALPROEX SODIUM 500 MG/1
500 TABLET, EXTENDED RELEASE ORAL DAILY
Qty: 30 TABLET | Refills: 2 | Status: ON HOLD | OUTPATIENT
Start: 2020-04-04 | End: 2020-09-14 | Stop reason: HOSPADM

## 2020-04-03 RX ORDER — ASPIRIN 81 MG/1
81 TABLET ORAL DAILY
Qty: 30 TABLET | Refills: 2 | Status: SHIPPED | OUTPATIENT
Start: 2020-04-04

## 2020-04-03 RX ORDER — ASPIRIN 81 MG/1
81 TABLET ORAL DAILY
Qty: 30 TABLET | Refills: 2 | Status: SHIPPED | OUTPATIENT
Start: 2020-04-04 | End: 2020-04-03

## 2020-04-03 RX ORDER — TRAZODONE HYDROCHLORIDE 50 MG/1
50 TABLET ORAL NIGHTLY
Qty: 30 TABLET | Refills: 2 | Status: SHIPPED | OUTPATIENT
Start: 2020-04-03 | End: 2020-04-03

## 2020-04-03 RX ADMIN — FAMOTIDINE 20 MG: 20 TABLET, FILM COATED ORAL at 08:34

## 2020-04-03 RX ADMIN — DIVALPROEX SODIUM 500 MG: 500 TABLET, EXTENDED RELEASE ORAL at 08:34

## 2020-04-03 RX ADMIN — LEVOTHYROXINE SODIUM 100 MCG: 100 TABLET ORAL at 08:34

## 2020-04-03 RX ADMIN — CITALOPRAM HYDROBROMIDE 20 MG: 20 TABLET ORAL at 08:34

## 2020-04-03 RX ADMIN — ASPIRIN 81 MG: 81 TABLET, COATED ORAL at 08:34

## 2020-04-03 RX ADMIN — DEXTROMETHORPHAN HYDROBROMIDE AND QUINIDINE SULFATE 1 CAPSULE: 20; 10 CAPSULE, GELATIN COATED ORAL at 08:34

## 2020-04-03 RX ADMIN — CIPROFLOXACIN HYDROCHLORIDE 500 MG: 500 TABLET, FILM COATED ORAL at 08:34

## 2020-04-03 ASSESSMENT — PAIN SCALES - GENERAL: PAINLEVEL_OUTOF10: 0

## 2020-04-03 NOTE — BH NOTE
Pt discharged to home. Daughter arrived to transport home via private vehicle. Pt required assist of staff to get into vehicle. Went over discharge paper work. Daughter requesting to have medications sent to [facility] pharmacy in [facility]. Both pt and daughter signed discharge paperwork. This nurse asked if they had any question. They had no questions at this time.
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
## 2020-04-03 NOTE — DISCHARGE SUMMARY
problems  or concerns with medications or side effects. Patient voiced progression towards  treatment goals and was offered a copy of updated treatment plan completed  during visit today. Denied any immediate needs or concerns. Pt throughout his stay on [facility] pt felt like her medications were working and  felt comfortable being discharged on these medications. Pt was advised to take  all medications as prescribed, follow up with all scheduled appointments and  abstain from any alcohol or illicit substances. Pt was in agreement. Pt felt  safe and comfortable to be discharged and to follow up with outpt mental health. Pt was very optimistic about her D/C and returning to her home. Pt stated that she   was doing \"good,\" today. Pt stated that she slept \"OK\" last night. Pt stated that her appetite is \"good. \"  Pt stated that she rates his depression a  \"0,\" on a scale of 0-10 with 10 being the worst and 0 being none. Pt stated  that she rates her anxiety an \"0,\" on the same scale. Pt denies any auditory  or visual hallucinations. Pt denied any thoughts to harm herself or anyone else. Pt felt safe and comfortable for D/C. The Pt was educated primarily by verbal means about her diagnoses and their  manifestations in her life. The option for treatment including group individual  therapy programming was offered to her and the use of medications with all their  potential risks, benefits, and side-effects were discussed with the pt at  length. Pt was given the opportunity to ask questions and she participated in the  treatment and planning process. Pt felt ready and eager to be discharged from  the from the [facility] unit. Pt felt she was safe for this disposition. Pt was considered to be able to  participate in informed consent and decision-making with respect to medical,  legal and financial issues at the time of her discharge from the [facility].     Due to physical debility and cognitive impairment Home Health Care is tolerated    FOLLOWUP APPOINTMENT(S):  As noted on the After Visit Summary    CONSULTS:  [address] Syed Directive POA was offered and reviewed with pt:      Talked to pts poa,went over patients benefits and other matters. Reveiwed financial options /programs etc.... CORE MEASURES  -Was the patient discharged on two or more Antipsychotics? NO  -If multiple Antipsychotic medications prescribed,is tapering recommended? NA    ? If yes, document plan: NA  ?  -If multiple Antipsychotic medications prescribed at discharge, is cross tapering in process at D/C? NO  -Have there been 3 or more failed attempts of mono therapy? NA  -If yes, list at least 3 of the failed Antipsychotic medications with the reason why each one failed: NA    -Was Hemoglobin A1C or fasting Glucose measure done within the last 12 months? YES  -Lipid Panel measure done within the last 12 months? YES  -Pt was offered an FDA approved medication for Chemical Dependency: (offered refused/ordered) NA  -Pt was offered for discharged on tobacco/nicotine cessation and/or codependency cessation via medication assistance: (offered refused/ordered) NA    More than 30 mins was spent face to face with the patient to discuss the diagnosis, treatment recommendations, and prognosis. Safety planning was also reviewed. The patient agreed to go to the nearest ER or call 911 if they experienced an emergency    The patient was admitted to the psychiatric unit and monitored for stabilization. A multidisciplinary team met with the patient on a daily basis. The diagnosis, treatment recommendations, and prognosis were reviewed with the patient.       Objective:  Vital signs in last 24 hours:  Vitals:    04/03/20 0745   BP: 119/66   Pulse: 76   Resp: 16   Temp: 98 °F (36.7 °C)   SpO2: 99%       Labs:      Recent Results (from the past 987 hour(s))   Salicylate    Collection Time: 03/25/20  2:36 PM   Result Value Ref Range    Salicylate Lvl <8.6 (L) 15 - 30 cefepime <=4 Sensitive      cefTRIAXone <=1 Sensitive      ciprofloxacin <=1 Sensitive      gentamicin <=2 Sensitive      tobramycin <=4 Sensitive      trimethoprim-sulfamethoxazole <=2/38 Sensitive        40 Minutes    Electronically signed by KALIN Coleman CNP on 4/3/2020 at 10:38 AM

## 2020-04-03 NOTE — DISCHARGE INSTR - COC
Continuity of Care Form    Patient Name: Feli Ham   :    MRN:  [id number]    Admit date:  3/26/2020  Discharge date:  4/3/2020    Code Status Order: Full Code   Advance Directives:   [facility] Documentation     Date/Time Healthcare Directive Type of Healthcare Directive Copy in [facility] [address] Agent's Name Healthcare Agent's Phone Number    20 1206  No, patient does not have an advance directive for healthcare treatment -- -- -- -- --    20 0214  No, patient does not have an advance directive for healthcare treatment -- -- -- -- --          Admitting Physician:  Laura Wise DO  PCP: Toby Gallegos MD    Discharging Nurse: [facility] Unit/Room#: 0915/9354-99  Discharging Unit Phone Number: ***    Emergency Contact:   Extended Emergency Contact Information  [facility] Phone: [phone]  Relation: Child  Secondary Emergency Contact: [address] [place] [address] Phone: [phone]  Relation: Child    Past Surgical History:  Past Surgical History:   Procedure Laterality Date    APPENDECTOMY      age 15   [facility] APPENDECTOMY      COLONOSCOPY      TONSILLECTOMY AND ADENOIDECTOMY      age 6   [address]         Immunization History: There is no immunization history on file for this patient. Active Problems:  Patient Active Problem List   Diagnosis Code    Hypothyroidism E03.9    Pure hypercholesterolemia E78.00    Overweight (BMI 25.0-29. 9) E66.3    Cerebrovascular accident (CVA) due to thrombosis of precerebral artery (HCC) I63.00    Expressive aphasia R47.01    Right arm weakness R29.898    Right leg weakness R29.898    Major depressive disorder, severe (HCC) F32.2    Severe recurrent major depression without psychotic features ([facility] [address] F33.2    Acute cystitis without hematuria N30.00       Isolation/Infection:   Isolation          No Isolation        Patient INTERVENTION:[id number]}  Weight Bearing Status/Restrictions: [address] [employer] Weight Bearin}  Other Medical Equipment (for information only, NOT a DME order):  {EQUIPMENT:[id number]}  Other Treatments: ***    Patient's personal belongings (please select all that are sent with patient):  {CHP DME Belongings:302861985}    RN SIGNATURE:  {Esignature:[id number]}    CASE MANAGEMENT/SOCIAL WORK SECTION    Inpatient Status Date: ***    Readmission Risk Assessment Score:  Readmission Risk              Risk of Unplanned Readmission:        9           Discharging to Facility/ Agency   · Name:   · Address:  · Phone:  · Fax:    Dialysis Facility (if applicable)   · Name:  · Address:  · Dialysis Schedule:  · Phone:  · Fax:    / signature: {Esignature:[id number]}    PHYSICIAN SECTION    Prognosis: Good    Condition at Discharge: Stable    Rehab Potential (if transferring to Rehab): Good    Recommended Labs or Other Treatments After Discharge: as recommended by PCP    Physician Certification: I certify the above information and transfer of Heather Fu  is necessary for the continuing treatment of the diagnosis listed and that she requires Home Care for greater 30 days.      Update Admission H&P: No change in H&P    PHYSICIAN SIGNATURE:  Electronically signed by KALIN Linda CNP on 4/3/20 at 10:45 AM EDT

## 2020-04-06 ENCOUNTER — TELEPHONE (OUTPATIENT)
Dept: FAMILY MEDICINE CLINIC | Age: 75
End: 2020-04-06

## 2020-04-06 NOTE — TELEPHONE ENCOUNTER
Daughter will set up [facility]. Daughter states that pt is not sleeping very well. Up 4-5 times a night, violent with family and caregivers. Pt is doing trazodone at night and divalproex in the morning. After taking the divalproex, it makes her very drowsy.      Please advise/

## 2020-04-07 ENCOUNTER — TELEPHONE (OUTPATIENT)
Dept: FAMILY MEDICINE CLINIC | Age: 75
End: 2020-04-07

## 2020-04-20 ENCOUNTER — TELEPHONE (OUTPATIENT)
Dept: FAMILY MEDICINE CLINIC | Age: 75
End: 2020-04-20

## 2020-04-22 ENCOUNTER — TELEPHONE (OUTPATIENT)
Dept: FAMILY MEDICINE CLINIC | Age: 75
End: 2020-04-22

## 2020-04-22 NOTE — TELEPHONE ENCOUNTER
Spoke with Juan Escalona and states all they told her was \"plan of care. \"  She did give me a phone number of [facility] [employer] [phone]. Left a voicemail for the admission nurse. What ALL are they requiring we send? ?  Med list , diet, therapy? Please get a detailed list of everything we need and the fax number. Thanks!

## 2020-04-22 NOTE — TELEPHONE ENCOUNTER
Shaye from [place] [address] called back in regarding this. She said they have a specific from that needs filled out which she will fax in, also a current med list and if any kind of therapy is needed for patient while there they would need an order for that also. She is faxing the form today 4/22/2020.

## 2020-04-23 NOTE — TELEPHONE ENCOUNTER
Spoke with Laure Fermin, while patient was on VV the other day with Laure Fermin, no discussion of AL was discussed. At that point patient was going to stay home. Unsure of what exactly the patient needs therapy and diet wise. Spoke with daughter and asked if it was okay to set up another VV with [facility] so we can reasess her for AL living instead of home living. Daughter was okay with that, set apt up tomorrow with [facility].  Will send paperwork with Rene Araya so Fermín Stark will have it tomorrow

## 2020-04-24 ENCOUNTER — VIRTUAL VISIT (OUTPATIENT)
Dept: FAMILY MEDICINE CLINIC | Age: 75
End: 2020-04-24
Payer: MEDICARE

## 2020-04-24 VITALS — BODY MASS INDEX: 21.4 KG/M2 | SYSTOLIC BLOOD PRESSURE: 116 MMHG | DIASTOLIC BLOOD PRESSURE: 68 MMHG | WEIGHT: 117 LBS

## 2020-04-24 PROCEDURE — 1111F DSCHRG MED/CURRENT MED MERGE: CPT | Performed by: FAMILY MEDICINE

## 2020-04-24 PROCEDURE — 99214 OFFICE O/P EST MOD 30 MIN: CPT | Performed by: FAMILY MEDICINE

## 2020-04-24 PROCEDURE — G8427 DOCREV CUR MEDS BY ELIG CLIN: HCPCS | Performed by: FAMILY MEDICINE

## 2020-04-24 PROCEDURE — 1090F PRES/ABSN URINE INCON ASSESS: CPT | Performed by: FAMILY MEDICINE

## 2020-04-24 PROCEDURE — G8400 PT W/DXA NO RESULTS DOC: HCPCS | Performed by: FAMILY MEDICINE

## 2020-04-24 PROCEDURE — 4040F PNEUMOC VAC/ADMIN/RCVD: CPT | Performed by: FAMILY MEDICINE

## 2020-04-24 PROCEDURE — 3017F COLORECTAL CA SCREEN DOC REV: CPT | Performed by: FAMILY MEDICINE

## 2020-04-24 PROCEDURE — 1123F ACP DISCUSS/DSCN MKR DOCD: CPT | Performed by: FAMILY MEDICINE

## 2020-04-24 RX ORDER — FLUCONAZOLE 150 MG/1
TABLET ORAL
Qty: 2 TABLET | Refills: 0 | Status: SHIPPED | OUTPATIENT
Start: 2020-04-24 | End: 2020-04-25

## 2020-04-24 ASSESSMENT — ENCOUNTER SYMPTOMS
BLOOD IN STOOL: 0
CONSTIPATION: 0
BACK PAIN: 0
VOMITING: 0
RHINORRHEA: 0
DIARRHEA: 0
COUGH: 0
SORE THROAT: 0
WHEEZING: 0
SHORTNESS OF BREATH: 0
NAUSEA: 0
CHEST TIGHTNESS: 0
ABDOMINAL PAIN: 0
EYE PAIN: 0

## 2020-04-24 NOTE — PATIENT INSTRUCTIONS
live cultures. It has bacteria called lactobacillus. It may help prevent some types of yeast infections. · Keep your skin clean and dry. Put powder on moist places. · If you are using a cream or suppository to treat a vaginal yeast infection, don't use condoms or a diaphragm. Use a different type of birth control. · Eat a healthy diet and get regular exercise. This will help keep your immune system strong. When should you call for help? Watch closely for changes in your health, and be sure to contact your doctor if:    · You do not get better as expected. Where can you learn more? Go to [email or web address] org and sign in to your [employer] account. Enter L439 in the Vetr box to learn more about \"Candidiasis: Care Instructions. \"     If you do not have an account, please click on the \"Sign Up Now\" link. Current as of: November 7, 2019Content Version: 12.4  © 6072-8410 [employer]. Care instructions adapted under license by [facility] [employer] [facility] ([facility]). If you have questions about a medical condition or this instruction, always ask your healthcare professional. [place] [address] any warranty or liability for your use of this information. Patient Education        Dementia: Care Instructions  Your Care Instructions    Dementia is a loss of mental skills that affects your daily life. It is different than the occasional trouble with memory that is part of aging. You may find it hard to remember things that you feel you should be able to remember. Or you may feel that your mind is just not working as well as usual.  Finding out that you have dementia is a shock. You may be afraid and worried about how the condition will change your life. Although there is no cure at this time, medicine may slow memory loss and improve thinking for a while. Other medicines may be able to help you sleep or cope with depression and behavior changes.   Dementia often gets worse If you do not have an account, please click on the \"Sign Up Now\" link. Current as of: May 28, 2019Content Version: 12.4  © 4026-0070 [employer]. Care instructions adapted under license by [facility] ([facility]). If you have questions about a medical condition or this instruction, always ask your healthcare professional. [address] any warranty or liability for your use of this information. Patient Education        Depression Treatment: Care Instructions  Your Care Instructions    Depression is a condition that affects the way you feel, think, and act. It causes symptoms such as low energy, loss of interest in daily activities, and sadness or grouchiness that goes on for a long time. Depression is very common and affects men and women of all ages. Depression is a medical illness caused by changes in the natural chemicals in your brain. It is not a character flaw, and it does not mean that you are a bad or weak person. It does not mean that you are going crazy. It is important to know that depression can be treated. Medicines, counseling, and self-care can all help. Many people do not get help because they are embarrassed or think that they will get over the depression on their own. But some people do not get better without treatment. Follow-up care is a key part of your treatment and safety. Be sure to make and go to all appointments, and call your doctor if you are having problems. It's also a good idea to know your test results and keep a list of the medicines you take. How can you care for yourself at home? Learn about antidepressant medicines  Antidepressant medicines can improve or end the symptoms of depression. You may need to take the medicine for at least 6 months, and often longer. Keep taking your medicine even if you feel better. If you stop taking it too soon, your symptoms may come back or get worse.   You may start to feel better within 1 to 3 weeks of May 28, 2019Content Version: 12.4  © 3940-4515 Healthwise, Incorporated. Care instructions adapted under license by [facility] ([facility]). If you have questions about a medical condition or this instruction, always ask your healthcare professional. [address] any warranty or liability for your use of this information.

## 2020-04-24 NOTE — PROGRESS NOTES
2020    TELEHEALTH EVALUATION -- Audio/Visual (During XXASH-51 public health emergency)  Patient at home. Physician at office. Visit using synchronous telecommunication system. HPI:    Oneida Pederson (:  1945) has requested an audio/video evaluation for the following concern(s):    Seen today for possible assisted living placement. She is seen with her daughter. She is in [facility] currently. They are looking a couple of facilities. She had a recent psych hospitalization for depression and behaviors. She is better on the meds. She is sleeping better with the increased trazodone. She needs facility care. Does have a vaginal yeast infection due to antibiotics for UTI. Reviewed BMI of 21. , non smoker, pmh reviewed. She is on a regular diet. She does not request any therapies. Review of Systems   Constitutional: Negative for chills, fatigue, fever and unexpected weight change. HENT: Negative for congestion, ear pain, rhinorrhea and sore throat. Eyes: Negative for pain and visual disturbance. Respiratory: Negative for cough, chest tightness, shortness of breath and wheezing. Cardiovascular: Negative for chest pain and palpitations. Gastrointestinal: Negative for abdominal pain, blood in stool, constipation, diarrhea, nausea and vomiting. Genitourinary: Negative for difficulty urinating, frequency, hematuria and urgency. Musculoskeletal: Negative for back pain, joint swelling, myalgias and neck pain. Skin: Negative for rash. Neurological: Positive for speech difficulty and weakness. Negative for dizziness and headaches. Hematological: Negative for adenopathy. Does not bruise/bleed easily. Psychiatric/Behavioral: Positive for behavioral problems and dysphoric mood. Negative for sleep disturbance. The patient is not nervous/anxious. Prior to Visit Medications    Medication Sig Taking?  Authorizing Provider   fluconazole (DIFLUCAN) 150 MG tablet virtually to substitute for in-person clinic visit. Patient and provider were located at their individual homes. --Rita Martinez MD on 4/24/2020 at 11:44 AM    An electronic signature was used to authenticate this note.

## 2020-04-25 ENCOUNTER — TELEPHONE (OUTPATIENT)
Dept: FAMILY MEDICINE CLINIC | Age: 75
End: 2020-04-25

## 2020-04-25 NOTE — TELEPHONE ENCOUNTER
Telephone call received from [facility] in [place], [place]. Pt admitted to facility after Geriatric/Psychiatry admission down near [place]. Pt apparently living with daughter at time and now feels pt unable to live with her and  admitted for nursing care,. Orders verified and Ranitidine 150 mg- 1 pill BID changed to Famotidine 20 mg- 1 pill BID. Pt refusing Lipitor 80 mg, Baclofen 10 mg, and Nuedexta upon discharge from Hospital. They will seek new PCP as pt will be moving to that area.    ES

## 2020-04-26 ENCOUNTER — TELEPHONE (OUTPATIENT)
Dept: FAMILY MEDICINE CLINIC | Age: 75
End: 2020-04-26

## 2020-04-28 ENCOUNTER — TELEPHONE (OUTPATIENT)
Dept: FAMILY MEDICINE CLINIC | Age: 75
End: 2020-04-28

## 2020-05-11 ENCOUNTER — TELEPHONE (OUTPATIENT)
Dept: FAMILY MEDICINE CLINIC | Age: 75
End: 2020-05-11

## 2020-05-11 NOTE — TELEPHONE ENCOUNTER
----- Message from Jessica Triana MD sent at 5/7/2020 12:12 PM EDT -----  Level noted. Low due to being used for behaviors, not high doses for seizures.   Continue present.  ----- Message -----  From: Dayan Jay CMA ([facility])  Sent: 5/7/2020  12:08 PM EDT  To: Jessica Triana MD

## 2020-05-11 NOTE — TELEPHONE ENCOUNTER
Pt daughter notified, she states Pt is currently resident at [facility].  Results faxed to [phone]

## 2020-09-04 ENCOUNTER — APPOINTMENT (OUTPATIENT)
Dept: CT IMAGING | Age: 75
End: 2020-09-04
Payer: MEDICARE

## 2020-09-04 ENCOUNTER — HOSPITAL ENCOUNTER (INPATIENT)
Age: 75
LOS: 10 days | Discharge: NURSING, REHAB OR LONG-TERM CARE | DRG: 885 | End: 2020-09-14
Attending: PSYCHIATRY & NEUROLOGY | Admitting: PSYCHIATRY & NEUROLOGY
Payer: MEDICARE

## 2020-09-04 ENCOUNTER — HOSPITAL ENCOUNTER (EMERGENCY)
Age: 75
Discharge: ANOTHER HOSPITAL | End: 2020-09-04
Attending: EMERGENCY MEDICINE
Payer: MEDICARE

## 2020-09-04 VITALS
WEIGHT: 130 LBS | DIASTOLIC BLOOD PRESSURE: 57 MMHG | SYSTOLIC BLOOD PRESSURE: 94 MMHG | HEIGHT: 62 IN | HEART RATE: 87 BPM | OXYGEN SATURATION: 97 % | BODY MASS INDEX: 23.92 KG/M2 | RESPIRATION RATE: 14 BRPM | TEMPERATURE: 99 F

## 2020-09-04 LAB
ACETAMINOPHEN LEVEL: <5 UG/ML (ref 15–30)
ALBUMIN SERPL-MCNC: 4.4 GM/DL (ref 3.4–5)
ALP BLD-CCNC: 56 IU/L (ref 40–129)
ALT SERPL-CCNC: 6 U/L (ref 10–40)
AMPHETAMINES: NEGATIVE
ANION GAP SERPL CALCULATED.3IONS-SCNC: 5 MMOL/L (ref 4–16)
AST SERPL-CCNC: 15 IU/L (ref 15–37)
BACTERIA: ABNORMAL /HPF
BARBITURATE SCREEN URINE: NEGATIVE
BASOPHILS ABSOLUTE: 0.1 K/CU MM
BASOPHILS RELATIVE PERCENT: 1.1 % (ref 0–1)
BENZODIAZEPINE SCREEN, URINE: NEGATIVE
BILIRUB SERPL-MCNC: 0.4 MG/DL (ref 0–1)
BILIRUBIN URINE: NEGATIVE MG/DL
BLOOD, URINE: NEGATIVE
BUN BLDV-MCNC: 8 MG/DL (ref 6–23)
CALCIUM SERPL-MCNC: 10 MG/DL (ref 8.3–10.6)
CANNABINOID SCREEN URINE: NEGATIVE
CAST TYPE: NEGATIVE /HPF
CHLORIDE BLD-SCNC: 100 MMOL/L (ref 99–110)
CLARITY: ABNORMAL
CO2: 32 MMOL/L (ref 21–32)
COCAINE METABOLITE: NEGATIVE
COLOR: ABNORMAL
CREAT SERPL-MCNC: 0.6 MG/DL (ref 0.6–1.1)
CRYSTAL TYPE: NEGATIVE /HPF
DIFFERENTIAL TYPE: ABNORMAL
DOSE AMOUNT: ABNORMAL
DOSE AMOUNT: ABNORMAL
DOSE TIME: ABNORMAL
DOSE TIME: ABNORMAL
EOSINOPHILS ABSOLUTE: 0.1 K/CU MM
EOSINOPHILS RELATIVE PERCENT: 1.5 % (ref 0–3)
EPITHELIAL CELLS, UA: ABNORMAL /HPF
GFR AFRICAN AMERICAN: >60 ML/MIN/1.73M2
GFR NON-AFRICAN AMERICAN: >60 ML/MIN/1.73M2
GLUCOSE BLD-MCNC: 101 MG/DL (ref 70–99)
GLUCOSE, URINE: NEGATIVE MG/DL
HCT VFR BLD CALC: 39.1 % (ref 37–47)
HEMOGLOBIN: 12.5 GM/DL (ref 12.5–16)
IMMATURE NEUTROPHIL %: 0.1 % (ref 0–0.43)
KETONES, URINE: NEGATIVE MG/DL
LEUKOCYTE ESTERASE, URINE: NEGATIVE
LYMPHOCYTES ABSOLUTE: 2 K/CU MM
LYMPHOCYTES RELATIVE PERCENT: 28.2 % (ref 24–44)
MCH RBC QN AUTO: 31.6 PG (ref 27–31)
MCHC RBC AUTO-ENTMCNC: 32 % (ref 32–36)
MCV RBC AUTO: 99 FL (ref 78–100)
MONOCYTES ABSOLUTE: 0.7 K/CU MM
MONOCYTES RELATIVE PERCENT: 9.2 % (ref 0–4)
NITRITE URINE, QUANTITATIVE: NEGATIVE
OPIATES, URINE: NEGATIVE
OXYCODONE: NEGATIVE
PDW BLD-RTO: 13 % (ref 11.7–14.9)
PH, URINE: 6.5 (ref 5–8)
PHENCYCLIDINE, URINE: NEGATIVE
PLATELET # BLD: 221 K/CU MM (ref 140–440)
PMV BLD AUTO: 9.7 FL (ref 7.5–11.1)
POTASSIUM SERPL-SCNC: 3.8 MMOL/L (ref 3.5–5.1)
PROTEIN UA: NEGATIVE MG/DL
RBC # BLD: 3.95 M/CU MM (ref 4.2–5.4)
RBC URINE: NEGATIVE /HPF (ref 0–6)
SALICYLATE LEVEL: <0.3 MG/DL (ref 15–30)
SARS-COV-2, NAAT: NOT DETECTED
SEGMENTED NEUTROPHILS ABSOLUTE COUNT: 4.3 K/CU MM
SEGMENTED NEUTROPHILS RELATIVE PERCENT: 59.9 % (ref 36–66)
SODIUM BLD-SCNC: 137 MMOL/L (ref 135–145)
SOURCE: NORMAL
SPECIFIC GRAVITY UA: 1.01 (ref 1–1.03)
TOTAL IMMATURE NEUTOROPHIL: 0.01 K/CU MM
TOTAL PROTEIN: 7.1 GM/DL (ref 6.4–8.2)
TOTAL RETICULOCYTE COUNT: 0.08 K/CU MM
UROBILINOGEN, URINE: 0.2 MG/DL (ref 0.2–1)
WBC # BLD: 7.2 K/CU MM (ref 4–10.5)
WBC UA: NEGATIVE /HPF (ref 0–5)

## 2020-09-04 PROCEDURE — 1240000000 HC EMOTIONAL WELLNESS R&B

## 2020-09-04 PROCEDURE — 80053 COMPREHEN METABOLIC PANEL: CPT

## 2020-09-04 PROCEDURE — G0480 DRUG TEST DEF 1-7 CLASSES: HCPCS

## 2020-09-04 PROCEDURE — 81001 URINALYSIS AUTO W/SCOPE: CPT

## 2020-09-04 PROCEDURE — 96374 THER/PROPH/DIAG INJ IV PUSH: CPT

## 2020-09-04 PROCEDURE — 6360000002 HC RX W HCPCS: Performed by: EMERGENCY MEDICINE

## 2020-09-04 PROCEDURE — 93005 ELECTROCARDIOGRAM TRACING: CPT | Performed by: EMERGENCY MEDICINE

## 2020-09-04 PROCEDURE — U0002 COVID-19 LAB TEST NON-CDC: HCPCS

## 2020-09-04 PROCEDURE — 70450 CT HEAD/BRAIN W/O DYE: CPT

## 2020-09-04 PROCEDURE — 85025 COMPLETE CBC W/AUTO DIFF WBC: CPT

## 2020-09-04 PROCEDURE — 99285 EMERGENCY DEPT VISIT HI MDM: CPT

## 2020-09-04 PROCEDURE — 80307 DRUG TEST PRSMV CHEM ANLYZR: CPT

## 2020-09-04 RX ORDER — LORAZEPAM 2 MG/ML
1 INJECTION INTRAMUSCULAR ONCE
Status: COMPLETED | OUTPATIENT
Start: 2020-09-04 | End: 2020-09-04

## 2020-09-04 RX ORDER — LORAZEPAM 1 MG/1
1 TABLET ORAL ONCE
Status: DISCONTINUED | OUTPATIENT
Start: 2020-09-04 | End: 2020-09-04

## 2020-09-04 RX ADMIN — LORAZEPAM 1 MG: 2 INJECTION INTRAMUSCULAR; INTRAVENOUS at 16:22

## 2020-09-04 ASSESSMENT — SLEEP AND FATIGUE QUESTIONNAIRES
DO YOU USE A SLEEP AID: NO
DO YOU HAVE DIFFICULTY SLEEPING: YES
AVERAGE NUMBER OF SLEEP HOURS: 2
RESTFUL SLEEP: NO
DIFFICULTY STAYING ASLEEP: YES
DIFFICULTY ARISING: YES
DIFFICULTY FALLING ASLEEP: YES
SLEEP PATTERN: INSOMNIA

## 2020-09-04 ASSESSMENT — PAIN SCALES - PAIN ASSESSMENT IN ADVANCED DEMENTIA (PAINAD)
TOTALSCORE: 7
FACIALEXPRESSION: 2
NEGVOCALIZATION: 1
BODYLANGUAGE: 2
CONSOLABILITY: 1
BREATHING: 1

## 2020-09-04 ASSESSMENT — PAIN DESCRIPTION - FREQUENCY: FREQUENCY: CONTINUOUS

## 2020-09-04 ASSESSMENT — PAIN DESCRIPTION - LOCATION: LOCATION: LEG

## 2020-09-04 ASSESSMENT — PAIN DESCRIPTION - ORIENTATION: ORIENTATION: RIGHT;LEFT

## 2020-09-04 NOTE — PROGRESS NOTES
1845  Call from [facility] from [place] stating all labs are back except COVID test, pt is medically cleared.
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
## 2020-09-04 NOTE — ED PROVIDER NOTES
CARE RECEIVED FROM: Dr. Toyin Hilliard   I reviewed the ambrosio elements of the history, physical exam and initial treatment plan at the bedside. ANCILLARY DATA:  I reviewed the images. Radiologist interpretation:   CT HEAD WO CONTRAST   Final Result   1. No acute intracranial abnormality. 2. Chronic left MCA territory infarction. Labs Reviewed   CBC WITH AUTO DIFFERENTIAL - Abnormal; Notable for the following components:       Result Value    RBC 3.95 (*)     MCH 31.6 (*)     Monocytes % 9.2 (*)     Basophils % 1.1 (*)     All other components within normal limits   COMPREHENSIVE METABOLIC PANEL - Abnormal; Notable for the following components:    Glucose 101 (*)     ALT 6 (*)     All other components within normal limits   URINALYSIS - Abnormal; Notable for the following components:    Cast Type NEGATIVE (*)     All other components within normal limits   ACETAMINOPHEN LEVEL - Abnormal; Notable for the following components:    Acetaminophen Level <5.0 (*)     All other components within normal limits   SALICYLATE LEVEL - Abnormal; Notable for the following components:    Salicylate Lvl <9.7 (*)     All other components within normal limits   URINE DRUG SCREEN   COVID-19     MEDICAL DECISION MAKING / PLAN:  This is a 77-year-old female who presents from [facility] with some reports of some behavioral issues. Patient has apparently been experiencing increasing agitation while there and have been treated for UTI. There is issues with staff and they are not feeling safe around her with her history of dementia and worsening behavioral disturbances. She had medical screening performed here including laboratory studies and CT imaging of the brain which are reassuring. At time of signout the patient had been discussed with mental health team at [facility] and there are discussions of potentially having the patient placed at [facility]. Patient was ultimately accepted to [facility]. FINAL IMPRESSION:  1. Agitation    2.  Pain in both lower extremities      ? Electronically signed by:  River Mcgee, 9/4/2020        River Mcgee DO  09/04/20 3749
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
## 2020-09-04 NOTE — ED TRIAGE NOTES
Arrived per w/c to room 7-1 for triage. Tolerated without difficulty. Bed in lowest position. Call light given.  Gowned for exam.

## 2020-09-05 PROBLEM — F03.91 MAJOR NEUROCOGNITIVE DISORDER POSSIBLY DUE TO VASCULAR DISEASE, WITH BEHAVIORAL DISTURBANCE (HCC): Status: ACTIVE | Noted: 2020-09-05

## 2020-09-05 PROCEDURE — 6360000002 HC RX W HCPCS: Performed by: PSYCHIATRY & NEUROLOGY

## 2020-09-05 PROCEDURE — 6360000002 HC RX W HCPCS

## 2020-09-05 PROCEDURE — 6370000000 HC RX 637 (ALT 250 FOR IP): Performed by: PSYCHIATRY & NEUROLOGY

## 2020-09-05 PROCEDURE — 90792 PSYCH DIAG EVAL W/MED SRVCS: CPT | Performed by: PSYCHIATRY & NEUROLOGY

## 2020-09-05 PROCEDURE — 93010 ELECTROCARDIOGRAM REPORT: CPT | Performed by: INTERNAL MEDICINE

## 2020-09-05 PROCEDURE — 1240000000 HC EMOTIONAL WELLNESS R&B

## 2020-09-05 RX ORDER — LEVOTHYROXINE SODIUM 0.1 MG/1
100 TABLET ORAL DAILY
Status: DISCONTINUED | OUTPATIENT
Start: 2020-09-05 | End: 2020-09-14 | Stop reason: HOSPADM

## 2020-09-05 RX ORDER — DIPHENHYDRAMINE HYDROCHLORIDE 50 MG/ML
50 INJECTION INTRAMUSCULAR; INTRAVENOUS EVERY 6 HOURS PRN
Status: DISCONTINUED | OUTPATIENT
Start: 2020-09-05 | End: 2020-09-05

## 2020-09-05 RX ORDER — HALOPERIDOL 5 MG/ML
5 INJECTION INTRAMUSCULAR EVERY 4 HOURS PRN
Status: DISCONTINUED | OUTPATIENT
Start: 2020-09-05 | End: 2020-09-08

## 2020-09-05 RX ORDER — RISPERIDONE 0.5 MG/1
0.5 TABLET, FILM COATED ORAL NIGHTLY
Status: DISCONTINUED | OUTPATIENT
Start: 2020-09-05 | End: 2020-09-06

## 2020-09-05 RX ORDER — DIVALPROEX SODIUM 125 MG/1
500 CAPSULE, COATED PELLETS ORAL NIGHTLY
Status: DISCONTINUED | OUTPATIENT
Start: 2020-09-05 | End: 2020-09-14 | Stop reason: HOSPADM

## 2020-09-05 RX ORDER — HALOPERIDOL 5 MG
5 TABLET ORAL EVERY 4 HOURS PRN
Status: DISCONTINUED | OUTPATIENT
Start: 2020-09-05 | End: 2020-09-08

## 2020-09-05 RX ORDER — DIPHENHYDRAMINE HYDROCHLORIDE 50 MG/ML
50 INJECTION INTRAMUSCULAR; INTRAVENOUS EVERY 6 HOURS PRN
Status: DISCONTINUED | OUTPATIENT
Start: 2020-09-05 | End: 2020-09-08

## 2020-09-05 RX ORDER — DIPHENHYDRAMINE HYDROCHLORIDE 50 MG/ML
INJECTION INTRAMUSCULAR; INTRAVENOUS
Status: COMPLETED
Start: 2020-09-05 | End: 2020-09-05

## 2020-09-05 RX ORDER — MAGNESIUM HYDROXIDE/ALUMINUM HYDROXICE/SIMETHICONE 120; 1200; 1200 MG/30ML; MG/30ML; MG/30ML
30 SUSPENSION ORAL EVERY 6 HOURS PRN
Status: DISCONTINUED | OUTPATIENT
Start: 2020-09-05 | End: 2020-09-14 | Stop reason: HOSPADM

## 2020-09-05 RX ORDER — LORAZEPAM 1 MG/1
1 TABLET ORAL EVERY 4 HOURS PRN
Status: DISCONTINUED | OUTPATIENT
Start: 2020-09-05 | End: 2020-09-08

## 2020-09-05 RX ORDER — TRAZODONE HYDROCHLORIDE 50 MG/1
50 TABLET ORAL NIGHTLY PRN
Status: DISCONTINUED | OUTPATIENT
Start: 2020-09-05 | End: 2020-09-14 | Stop reason: HOSPADM

## 2020-09-05 RX ORDER — ACETAMINOPHEN 500 MG
500 TABLET ORAL EVERY 4 HOURS PRN
Status: DISCONTINUED | OUTPATIENT
Start: 2020-09-05 | End: 2020-09-14 | Stop reason: HOSPADM

## 2020-09-05 RX ORDER — TRAZODONE HYDROCHLORIDE 100 MG/1
100 TABLET ORAL NIGHTLY
Status: DISCONTINUED | OUTPATIENT
Start: 2020-09-05 | End: 2020-09-14 | Stop reason: HOSPADM

## 2020-09-05 RX ORDER — LORAZEPAM 2 MG/ML
1 INJECTION INTRAMUSCULAR EVERY 4 HOURS PRN
Status: DISCONTINUED | OUTPATIENT
Start: 2020-09-05 | End: 2020-09-08

## 2020-09-05 RX ORDER — ASPIRIN 81 MG/1
81 TABLET, CHEWABLE ORAL DAILY
Status: DISCONTINUED | OUTPATIENT
Start: 2020-09-05 | End: 2020-09-14 | Stop reason: HOSPADM

## 2020-09-05 RX ORDER — ACETAMINOPHEN 325 MG/1
650 TABLET ORAL EVERY 4 HOURS PRN
Status: DISCONTINUED | OUTPATIENT
Start: 2020-09-05 | End: 2020-09-14 | Stop reason: HOSPADM

## 2020-09-05 RX ORDER — PANTOPRAZOLE SODIUM 40 MG/1
40 TABLET, DELAYED RELEASE ORAL
Status: DISCONTINUED | OUTPATIENT
Start: 2020-09-06 | End: 2020-09-14 | Stop reason: HOSPADM

## 2020-09-05 RX ORDER — DIPHENHYDRAMINE HCL 25 MG
50 CAPSULE ORAL EVERY 6 HOURS PRN
Status: DISCONTINUED | OUTPATIENT
Start: 2020-09-05 | End: 2020-09-08

## 2020-09-05 RX ORDER — CITALOPRAM 20 MG/1
20 TABLET ORAL DAILY
Status: DISCONTINUED | OUTPATIENT
Start: 2020-09-05 | End: 2020-09-08

## 2020-09-05 RX ORDER — ACETAMINOPHEN 325 MG/1
325 TABLET ORAL EVERY 4 HOURS PRN
Status: DISCONTINUED | OUTPATIENT
Start: 2020-09-05 | End: 2020-09-14 | Stop reason: HOSPADM

## 2020-09-05 RX ORDER — POLYETHYLENE GLYCOL 3350 17 G/17G
17 POWDER, FOR SOLUTION ORAL DAILY PRN
Status: DISCONTINUED | OUTPATIENT
Start: 2020-09-05 | End: 2020-09-14 | Stop reason: HOSPADM

## 2020-09-05 RX ORDER — RISPERIDONE 0.25 MG/1
0.25 TABLET, FILM COATED ORAL DAILY
Status: DISCONTINUED | OUTPATIENT
Start: 2020-09-06 | End: 2020-09-07

## 2020-09-05 RX ORDER — DIPHENHYDRAMINE HCL 25 MG
50 CAPSULE ORAL EVERY 6 HOURS PRN
Status: DISCONTINUED | OUTPATIENT
Start: 2020-09-05 | End: 2020-09-05

## 2020-09-05 RX ADMIN — TRAZODONE HYDROCHLORIDE 100 MG: 100 TABLET ORAL at 22:11

## 2020-09-05 RX ADMIN — DIPHENHYDRAMINE HYDROCHLORIDE 50 MG: 50 INJECTION, SOLUTION INTRAMUSCULAR; INTRAVENOUS at 18:03

## 2020-09-05 RX ADMIN — HALOPERIDOL LACTATE 5 MG: 5 INJECTION INTRAMUSCULAR at 18:02

## 2020-09-05 RX ADMIN — LORAZEPAM 1 MG: 2 INJECTION INTRAMUSCULAR; INTRAVENOUS at 18:02

## 2020-09-05 RX ADMIN — LEVOTHYROXINE SODIUM 100 MCG: 100 TABLET ORAL at 12:03

## 2020-09-05 RX ADMIN — DIVALPROEX SODIUM 500 MG: 125 CAPSULE, COATED PELLETS ORAL at 22:11

## 2020-09-05 RX ADMIN — HALOPERIDOL 5 MG: 5 TABLET ORAL at 08:39

## 2020-09-05 RX ADMIN — ASPIRIN 81 MG 81 MG: 81 TABLET ORAL at 12:03

## 2020-09-05 RX ADMIN — RISPERIDONE 0.5 MG: 0.5 TABLET ORAL at 22:10

## 2020-09-05 RX ADMIN — ACETAMINOPHEN 650 MG: 325 TABLET ORAL at 16:17

## 2020-09-05 RX ADMIN — ACETAMINOPHEN 650 MG: 325 TABLET ORAL at 06:07

## 2020-09-05 RX ADMIN — CITALOPRAM HYDROBROMIDE 20 MG: 20 TABLET ORAL at 12:03

## 2020-09-05 RX ADMIN — LORAZEPAM 1 MG: 1 TABLET ORAL at 08:39

## 2020-09-05 ASSESSMENT — PAIN DESCRIPTION - LOCATION
LOCATION: LEG
LOCATION: BACK

## 2020-09-05 ASSESSMENT — SLEEP AND FATIGUE QUESTIONNAIRES
DO YOU HAVE DIFFICULTY SLEEPING: COMMENT
DO YOU USE A SLEEP AID: NO
SLEEP PATTERN: UTA

## 2020-09-05 ASSESSMENT — PAIN SCALES - GENERAL
PAINLEVEL_OUTOF10: 2
PAINLEVEL_OUTOF10: 0
PAINLEVEL_OUTOF10: 10
PAINLEVEL_OUTOF10: 0
PAINLEVEL_OUTOF10: 10
PAINLEVEL_OUTOF10: 0

## 2020-09-05 ASSESSMENT — PAIN DESCRIPTION - ORIENTATION: ORIENTATION: RIGHT

## 2020-09-05 ASSESSMENT — PAIN DESCRIPTION - FREQUENCY: FREQUENCY: INTERMITTENT

## 2020-09-05 NOTE — PLAN OF CARE
Problem: Falls - Risk of:  Goal: Will remain free from falls  Description: Will remain free from falls  9/5/2020 0951 by Rickey Dodd RN  Outcome: Ongoing  9/5/2020 0042 by Arely Peng RN  Outcome: Ongoing  Goal: Absence of physical injury  Description: Absence of physical injury  9/5/2020 0951 by Rickey Dodd RN  Outcome: Ongoing  9/5/2020 0042 by Arely Peng RN  Outcome: Ongoing     Problem: Anger Management/Homicidal Ideation:  Goal: Able to display appropriate communication and problem solving  Description: Able to display appropriate communication and problem solving  9/5/2020 0951 by Rickey Dodd RN  Outcome: Ongoing  9/5/2020 0042 by Arely Peng RN  Outcome: Ongoing  Goal: Ability to verbalize frustrations and anger appropriately will improve  Description: Ability to verbalize frustrations and anger appropriately will improve  9/5/2020 0951 by Rickey Dodd RN  Outcome: Ongoing  9/5/2020 0042 by Arely Peng RN  Outcome: Ongoing  Goal: Absence of angry outbursts  Description: Absence of angry outbursts  9/5/2020 0951 by Rickey Dodd RN  Outcome: Ongoing  9/5/2020 0042 by Arely Peng RN  Outcome: Ongoing  Goal: Participates in care planning  Description: Participates in care planning  9/5/2020 0951 by Rickey Dodd RN  Outcome: Ongoing  9/5/2020 0042 by Arely Peng RN  Outcome: Ongoing     Problem: Skin Integrity:  Goal: Will show no infection signs and symptoms  Description: Will show no infection signs and symptoms  Outcome: Ongoing  Goal: Absence of new skin breakdown  Description: Absence of new skin breakdown  Outcome: Ongoing

## 2020-09-05 NOTE — BH NOTE
Pt was a new admit from ER. She arrived on the unit at approximately 2315. Pt was cooperative. Pt was only alert to self. When asked assessment questions, pt repeatedly answered with her name. Pt has hx of Stroke and has right sided weakness. Pt ambulates in wheelchair. Pt is a one person assist with standing and pivoting. Pt has been sleeping well. Pt did yell out around 0320, needing assistance to use the bathroom. Pt appeared to have VH with pointing and waving her left arm/hand in the air. Will continue to monitor pt for safety.
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
## 2020-09-05 NOTE — PROGRESS NOTES
Healthcare POA paperwork received by fax from pt's son Justin Costello. Paperwork does indicate that daughter Bakari Tom and son Justin Costello are both alternates after the primary, Gale Larkin, their father. Justin Costello does state that Gale Larkin has since passed away.
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
## 2020-09-05 NOTE — PLAN OF CARE
Problem: Falls - Risk of:  Goal: Will remain free from falls  Description: Will remain free from falls  Outcome: Ongoing  Goal: Absence of physical injury  Description: Absence of physical injury  Outcome: Ongoing     Problem: Anger Management/Homicidal Ideation:  Goal: Able to display appropriate communication and problem solving  Description: Able to display appropriate communication and problem solving  Outcome: Ongoing  Goal: Ability to verbalize frustrations and anger appropriately will improve  Description: Ability to verbalize frustrations and anger appropriately will improve  Outcome: Ongoing  Goal: Absence of angry outbursts  Description: Absence of angry outbursts  Outcome: Ongoing  Goal: Participates in care planning  Description: Participates in care planning  Outcome: Ongoing

## 2020-09-05 NOTE — BH NOTE
[facility]  Admission Note     Admission Type:   Admission Type:  Involuntary    Reason for admission:  Reason for Admission: Senile Dementia with Behavioral Disturbances    PATIENT STRENGTHS:  Strengths: Positive Support    Patient Strengths and Limitations:  Limitations: Difficult relationships / poor social skills, Multiple barriers to leisure interests, Unrealistic self-view, Tendency to isolate self, Perceives need for assistance with self-care    Addictive Behavior:        Medical Problems:   Past Medical History:   Diagnosis Date    Allergic rhinitis     Contact dermatitis     hands    Contact dermatitis     b/l hands    CVA [facility]cerebral vascular accident) ([facility] 07/04/2019    MCA Left parietal & temporal lobes, lentiform nucleus & perventricular white matter    Depression     Diastolic dysfunction     grade 1    Hypothyroidism     Pure hypercholesterolemia     [facility] 07/04/2019    Senile dementia with behavioral disturbance (HCC)        Status EXAM:  Status and Exam  Normal: No  Facial Expression: Flat  Affect: Stable  Level of Consciousness: Alert  Mood:Normal: No  Mood: Empty  Motor Activity:Normal: No  Motor Activity: Decreased  Interview Behavior: Cooperative(pt only oriented to self)  Preception: [place] to Person  Attention:Normal: No  Attention: Distractible  Thought Processes: Circumstantial  Thought Content:Normal: No  Thought Content: Poverty of Content  Hallucinations: None  Delusions: No  Memory:Normal: No  Memory: Poor Remote, Poor Recent  Insight and Judgment: No  Insight and Judgment: Poor Judgment, Poor Insight  Present Suicidal Ideation: No  Present Homicidal Ideation: No    Tobacco Screening:  Practical Counseling, on admission, bryant X, if applicable and completed (first 3 are required if patient doesn't refuse):            ( )  Recognizing danger situations (included triggers and roadblocks)                    ( )  Coping skills (new ways to manage stress, exercise, relaxation techniques, changing routine, distraction)                                                           ( )  Basic information about quitting (benefits of quitting, techniques in how to quit, available resources  ( ) Referral for counseling faxed to [facility]                                           ( ) Patient refused counseling  (X ) Patient has not smoked in the last 30 days      Pt admitted with followings belongings:  Dentures: None  Vision - Corrective Lenses: Glasses  Hearing Aid: None  Jewelry: None  Body Piercings Removed: N/A  Clothing: Shirt  Were All Patient Medications Collected?: Not Applicable(pt did not bring any meds)     Valuables sent home with N/A. Valuables placed in safe in security envelope, number:  N/A. Patient's home medications were N/A. Patient oriented to surroundings. Patient offered Psychiatric Advanced Directive: N/A. Received admission packet:  N/A. Consents reviewed, signed N/A. Pt brought by wheelchair from the ER. Pt was cooperative.                                     Patrick Baptiste RN

## 2020-09-05 NOTE — PROGRESS NOTES
Pt in room attempting to use BR. Pt was stuck on toilet, yelling out for help. This RN explained to pt that the [facility] is in her room to use d/t safety concerns. Pt assisted off of toilet and into w/c. Pt then brought down to TV area. Pt again down to room attempting to go into BR. Again, this RN explained for safety reasons that pt needs to use BSC. Pt agreed and was able to use BSC. Pt swatting at this RN when an attempt to help wash hand d/t pt digging out BM from rectum. Explained to pt that washing her hand was not optional at this point. Pt did accept soap and washed. Pt waited until staff left the room and shut her door. Explained to pt that door must remain open.

## 2020-09-05 NOTE — PROGRESS NOTES
Pt's son, Gerald Warren, called inquiring about pt. Per Johneric Briana, he and his sister, Tiffany Araujo have joint POA for healthcare. Gerald Warren states that Tiffany Araujo is making decisions regarding their mom without him. Also per Hermes Zepeda is set up that neither have to confer with the other to make decisionsbut his concern is that [facility] is not giving him info about his mom. Gerald Warren expresses concerns and would LSW call on Tuesday. Informed Gerald Warren that we need a copy of POA paperwork as this RN is unable to find in the chart. This RN also called Tiffany Santoroendy to request POA paperwork be brought in. Tiffany Araujo states she will bring in tomorrow as she is here now dropping off clothing and pt's leg brace.

## 2020-09-05 NOTE — PROGRESS NOTES
Pt in room yelling and screaming for her daughter. When staff went to room, pt continued to scream for Rosaura Alfredo. Attempted to get pt to calm down without success. Pt given Benadryl 50mg, Ativan 1 mg and Haldol 5 mg IM in R dorsogluteal. Pt resting in bed with bed alarm on.

## 2020-09-05 NOTE — PROGRESS NOTES
Daughter Stella Heart dropped off clothing:    Maury button up shirt  Green pull on pants  Black pull on pants  Grey/blue striped shirt  2 striped multi color button up shirts  Coffee pj pants (grey), coffee pj shirt (pink)    In locker:     White tennis shoes (laces)  Leg brace (unable to wear without shoes)  Wrist brace  Grey socks  White socks

## 2020-09-06 PROCEDURE — 99232 SBSQ HOSP IP/OBS MODERATE 35: CPT | Performed by: PSYCHIATRY & NEUROLOGY

## 2020-09-06 PROCEDURE — 6370000000 HC RX 637 (ALT 250 FOR IP): Performed by: PSYCHIATRY & NEUROLOGY

## 2020-09-06 PROCEDURE — 1240000000 HC EMOTIONAL WELLNESS R&B

## 2020-09-06 RX ORDER — RISPERIDONE 2 MG/1
2 TABLET, FILM COATED ORAL NIGHTLY
Status: DISCONTINUED | OUTPATIENT
Start: 2020-09-06 | End: 2020-09-07

## 2020-09-06 RX ADMIN — ACETAMINOPHEN 650 MG: 325 TABLET ORAL at 03:51

## 2020-09-06 RX ADMIN — RISPERIDONE 2 MG: 2 TABLET ORAL at 20:55

## 2020-09-06 RX ADMIN — RISPERIDONE 0.25 MG: 0.25 TABLET ORAL at 11:01

## 2020-09-06 RX ADMIN — TRAZODONE HYDROCHLORIDE 100 MG: 100 TABLET ORAL at 20:56

## 2020-09-06 RX ADMIN — CITALOPRAM HYDROBROMIDE 20 MG: 20 TABLET ORAL at 11:01

## 2020-09-06 RX ADMIN — PANTOPRAZOLE SODIUM 40 MG: 40 TABLET, DELAYED RELEASE ORAL at 11:00

## 2020-09-06 RX ADMIN — DIVALPROEX SODIUM 500 MG: 125 CAPSULE, COATED PELLETS ORAL at 20:55

## 2020-09-06 RX ADMIN — ASPIRIN 81 MG 81 MG: 81 TABLET ORAL at 11:01

## 2020-09-06 RX ADMIN — LEVOTHYROXINE SODIUM 100 MCG: 100 TABLET ORAL at 11:01

## 2020-09-06 ASSESSMENT — PAIN SCALES - PAIN ASSESSMENT IN ADVANCED DEMENTIA (PAINAD)
NEGVOCALIZATION: 0
BODYLANGUAGE: 0
CONSOLABILITY: 0
TOTALSCORE: 0
BREATHING: 0
FACIALEXPRESSION: 0

## 2020-09-06 ASSESSMENT — PAIN SCALES - GENERAL
PAINLEVEL_OUTOF10: 10
PAINLEVEL_OUTOF10: 0
PAINLEVEL_OUTOF10: 0

## 2020-09-06 NOTE — PLAN OF CARE
Problem: Falls - Risk of:  Goal: Will remain free from falls  Description: Will remain free from falls  9/5/2020 2257 by Dakota Allred RN  Outcome: Ongoing  9/5/2020 0951 by Anisha Hemphill RN  Outcome: Ongoing  Goal: Absence of physical injury  Description: Absence of physical injury  9/5/2020 2257 by Dakota Allred RN  Outcome: Ongoing  9/5/2020 0951 by Anisha Hemphill RN  Outcome: Ongoing     Problem: Anger Management/Homicidal Ideation:  Goal: Able to display appropriate communication and problem solving  Description: Able to display appropriate communication and problem solving  9/5/2020 2257 by Dakota Allred RN  Outcome: Ongoing  9/5/2020 0951 by Anisha Hemphill RN  Outcome: Ongoing  Goal: Ability to verbalize frustrations and anger appropriately will improve  Description: Ability to verbalize frustrations and anger appropriately will improve  9/5/2020 2257 by Dakota Allred RN  Outcome: Ongoing  9/5/2020 0951 by Anisha Hemphill RN  Outcome: Ongoing  Goal: Absence of angry outbursts  Description: Absence of angry outbursts  9/5/2020 2257 by Dakota Allred RN  Outcome: Ongoing  9/5/2020 0951 by Anisha Hemphill RN  Outcome: Ongoing  Goal: Participates in care planning  Description: Participates in care planning  9/5/2020 2257 by Dakota Allred RN  Outcome: Ongoing  9/5/2020 0951 by Anisha Hemphill RN  Outcome: Ongoing     Problem: Skin Integrity:  Goal: Will show no infection signs and symptoms  Description: Will show no infection signs and symptoms  9/5/2020 2257 by Dakota Allred RN  Outcome: Ongoing  9/5/2020 0951 by Anisha Hemphill RN  Outcome: Ongoing  Goal: Absence of new skin breakdown  Description: Absence of new skin breakdown  9/5/2020 2257 by Dakota Allred RN  Outcome: Ongoing  9/5/2020 0951 by Anisha Hemphill RN  Outcome: Ongoing

## 2020-09-06 NOTE — BH NOTE
Pt sleeping in bed upon this nurse's arrival to the unit. During nurse to nurse shift report, this nurse was notified of pt being administered IM emergency meds at [phone]. Pt has been sleeping comfortably for most of this shift. Pt has had 2 wet depends that have been changed. Pt was just administered 650mg Tylenol PO due to 10/10 back pain. Pt took her meds crushed in pudding at [facility] med pass and crushed in applesauce for the 0400 PO Tylenol administration. Pt also requesting apple juice to drink. Will continue to monitor pt for safety and for any changes in mood or behavior for the remainder of this nurses shift.
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
## 2020-09-06 NOTE — PROGRESS NOTES
Pt lethargic this afternoon/evening. Ate only approx 25% of dinner and then sat at table crying. Pointing to leg and when asked if leg hurt, said no. Offered to assist into recliner for comfort, agreed and when this RN attempted to help transfer, pt took off w/c locks and said \"no\". Sitting quietly at this time in w/c in tv area.

## 2020-09-06 NOTE — BH NOTE
Pt was offered both shower and bed bath. Pt refused to do either at this time. Toilet pt and provided heather care. Pt required max assist of two staff members for transferring and toileting.

## 2020-09-07 PROCEDURE — 1240000000 HC EMOTIONAL WELLNESS R&B

## 2020-09-07 PROCEDURE — 6370000000 HC RX 637 (ALT 250 FOR IP): Performed by: PSYCHIATRY & NEUROLOGY

## 2020-09-07 PROCEDURE — 6370000000 HC RX 637 (ALT 250 FOR IP): Performed by: INTERNAL MEDICINE

## 2020-09-07 PROCEDURE — 99232 SBSQ HOSP IP/OBS MODERATE 35: CPT | Performed by: PSYCHIATRY & NEUROLOGY

## 2020-09-07 RX ORDER — RISPERIDONE 0.5 MG/1
0.5 TABLET, FILM COATED ORAL
Status: DISCONTINUED | OUTPATIENT
Start: 2020-09-07 | End: 2020-09-09

## 2020-09-07 RX ORDER — ATORVASTATIN CALCIUM 40 MG/1
40 TABLET, FILM COATED ORAL NIGHTLY
Status: DISCONTINUED | OUTPATIENT
Start: 2020-09-07 | End: 2020-09-14 | Stop reason: HOSPADM

## 2020-09-07 RX ADMIN — PANTOPRAZOLE SODIUM 40 MG: 40 TABLET, DELAYED RELEASE ORAL at 08:38

## 2020-09-07 RX ADMIN — DIVALPROEX SODIUM 500 MG: 125 CAPSULE, COATED PELLETS ORAL at 20:29

## 2020-09-07 RX ADMIN — CITALOPRAM HYDROBROMIDE 20 MG: 20 TABLET ORAL at 08:38

## 2020-09-07 RX ADMIN — ATORVASTATIN CALCIUM 40 MG: 40 TABLET, FILM COATED ORAL at 20:28

## 2020-09-07 RX ADMIN — ASPIRIN 81 MG 81 MG: 81 TABLET ORAL at 08:38

## 2020-09-07 RX ADMIN — TRAZODONE HYDROCHLORIDE 100 MG: 100 TABLET ORAL at 20:28

## 2020-09-07 RX ADMIN — RISPERIDONE 0.25 MG: 0.25 TABLET ORAL at 08:38

## 2020-09-07 RX ADMIN — LEVOTHYROXINE SODIUM 100 MCG: 100 TABLET ORAL at 08:38

## 2020-09-07 RX ADMIN — RISPERIDONE 0.5 MG: 0.5 TABLET ORAL at 13:54

## 2020-09-07 RX ADMIN — RISPERIDONE 1.5 MG: 1 TABLET ORAL at 20:28

## 2020-09-07 ASSESSMENT — PAIN SCALES - GENERAL
PAINLEVEL_OUTOF10: 0

## 2020-09-07 ASSESSMENT — PAIN SCALES - PAIN ASSESSMENT IN ADVANCED DEMENTIA (PAINAD)
BREATHING: 0
FACIALEXPRESSION: 0
NEGVOCALIZATION: 0
BODYLANGUAGE: 0
CONSOLABILITY: 0
TOTALSCORE: 0
NEGVOCALIZATION: 0
TOTALSCORE: 0
BODYLANGUAGE: 0
BREATHING: 0
FACIALEXPRESSION: 0
CONSOLABILITY: 0

## 2020-09-07 NOTE — CONSULTS
Hospitalist Consult Note      Name:  Ismael Damon /Age/Sex: [id number]  [de-identified]76 y.o. female)   MRN & CSN:  [id number] & [id number] Admission Date/Time: 2020 11:24 PM   Location:  [facility] PCP: Alison Babinski, MD       Hospital Day: 4    Assessment and Plan:   Ismael Damon is a 76 y.o.  female  who presents with psychotic behavior with suicidal ideation  Hospitalist Team consulted for: Medical Management past medical history significant for recent CVA with chronic occlusion of the left MCA territory with residual right upper lower extremity defect, hyperlipidemia and hypothyroidism. 1.  Previous CVA: 2020 with recent imaging demonstrating chronic left MCA territory occlusion. Resulted below. In the setting of hyperlipidemia. Patient currently not on statin will initiate same. Patient currently not on DAPT but aspirin 81 mg. No records to review for recent CVA and medical management. Stable    2. Hyperlipidemia: LFTs within normal limits. Most recent results 3/20 lipid panel demonstrated a total cholesterol of 186 with  HDL 82 and triglycerides 50. Will initiate Lipitor 40 mg nightly at this time. 3.  Hypothyroidism: FT4 1.36 on 3/20. No overt signs will continue levothyroxine 100 mcg daily. Patient stable medically at this time. We will add Lipitor 40 mg at bedtime. Continue on aspirin continue to monitor. Of note hemoglobin A1c on 3/20 was 5.4      Diet DIET GENERAL;   DVT Prophylaxis [] Lovenox, []  Heparin, [] SCDs, [] No VTE prophylaxis, patient ambulating   GI Prophylaxis [] PPI, [] H2 Blocker, [] No GI prophylaxis, patient is receiving diet/Tube Feeds   Code Status Full Code   Disposition Patient requires continued admission due to SBU   MDM [] Low, [x] Moderate,[]  High  Patient's risk as above due to hypothyroidism, recent CVA of the left MCA territory with residual defects. Reported hyperlipidemia     History of Present Illness:     Pt S&E.   Not really dry.  NEURO Cranial nerves appear grossly intact, normal speech    Medications:   Medications:    risperiDONE  0.5 mg Oral Lunch    risperiDONE  1.5 mg Oral Nightly    divalproex  500 mg Oral Nightly    pantoprazole  40 mg Oral QAM AC    levothyroxine  100 mcg Oral Daily    traZODone  100 mg Oral Nightly    citalopram  20 mg Oral Daily    aspirin  81 mg Oral Daily      Infusions:   PRN Meds: acetaminophen, 325 mg, Q4H PRN  traZODone, 50 mg, Nightly PRN  polyethylene glycol, 17 g, Daily PRN  acetaminophen, 500 mg, Q4H PRN  acetaminophen, 650 mg, Q4H PRN  LORazepam, 1 mg, Q4H PRN    Or  LORazepam, 1 mg, Q4H PRN  haloperidol lactate, 5 mg, Q4H PRN    Or  haloperidol, 5 mg, Q4H PRN  aluminum & magnesium hydroxide-simethicone, 30 mL, Q6H PRN  diphenhydrAMINE, 50 mg, Q6H PRN  diphenhydrAMINE, 50 mg, Q6H PRN    CT of the head without contrast 9/20:  Narrative    EXAMINATION:    CT OF THE HEAD WITHOUT CONTRAST  9/4/2020 4:30 pm         TECHNIQUE:    CT of the head was performed without the administration of intravenous    contrast. Dose modulation, iterative reconstruction, and/or weight based    adjustment of the mA/kV was utilized to reduce the radiation dose to as low    as reasonably achievable.         COMPARISON:    None.         HISTORY:    ORDERING SYSTEM PROVIDED HISTORY: AMS, previous stroke    TECHNOLOGIST PROVIDED HISTORY:    Has a \"code stroke\" or \"stroke alert\" been called? ->No    Reason for exam:->AMS, previous stroke    Reason for Exam: AMS, previous stroke    Acuity: Acute    Type of Exam: Initial    Additional signs and symptoms: AMS, previous stroke    Relevant Medical/Surgical History: AMS, previous stroke         FINDINGS:    BRAIN/VENTRICLES: Chronic left middle cerebral artery territory infarction.     Ex vacuo dilatation of the left lateral ventricle.  No hydrocephalus.  No    mass or mass effect.  No evidence of acute territorial infarction or acute    intracranial hemorrhage.         ORBITS: The visualized portion of the orbits demonstrate no acute abnormality.         SINUSES: The visualized paranasal sinuses and mastoid air cells demonstrate    no acute abnormality.         SOFT TISSUES/SKULL:  No acute abnormality of the visualized skull or soft    tissues.              Impression    1. No acute intracranial abnormality.     2. Chronic left MCA territory infarction.           Electronically signed by Sisto Cranker, MD on 9/7/2020 at 4:13 PM

## 2020-09-07 NOTE — PROGRESS NOTES
Attempted to complete pt's assessment at ~0908. Pt eating breakfast. Therapist will attempt again at a later time.      Electronically signed by PRINCE Gilmore MA on 9/7/2020 at 9:21 AM

## 2020-09-07 NOTE — GROUP NOTE
Group Therapy Note    Date: 9/7/2020    Group Start Time: 0820  Group End Time: 0840    Number of Participants: 3/5    Type: Morning Goals Group/ Community Meeting    Group Topic/Objective: Set Goal For The Day and to review Unit Rules and Regulations. Patient's Goal:  Pt refused to state a goal.     Notes:  Pt minimally cooperative during group. Pt refused to state anything she was grateful for, how she was feeling, and refused to state a goal for the day. Pt did report restless sleep, but unsure how long she slept.      Depression (0-10): 0    Anxiety (0-10): 0    Irritability/Aggitation (0-10): 0    Status After Intervention:  Unchanged    Participation Level: Minimal    Participation Quality: Resistant    Speech:  normal    Thought Process/Content: Logical    Affective Functioning: Congruent    Mood: irritable    Level of consciousness:  Inattentive    Response to Learning: Resistant    Endings: None Reported    Modes of Intervention: Education, Support and Socialization    Discipline Responsible: Certified Therapeutic Recreation Specialist     Electronically signed by Caroline Cormier [address] [place] [address] MA on 9/7/2020 at 9:13 AM

## 2020-09-07 NOTE — PLAN OF CARE
Problem: Falls - Risk of:  Goal: Will remain free from falls  Description: Will remain free from falls  9/6/2020 2216 by Carmen Rolle RN  Outcome: Ongoing  9/6/2020 1352 by Jeri Ralph RN  Outcome: Ongoing  Goal: Absence of physical injury  Description: Absence of physical injury  9/6/2020 2216 by Carmen Rolle RN  Outcome: Ongoing  9/6/2020 1352 by Jeri Ralph RN  Outcome: Ongoing     Problem: Anger Management/Homicidal Ideation:  Goal: Able to display appropriate communication and problem solving  Description: Able to display appropriate communication and problem solving  9/6/2020 2216 by Carmen Rolle RN  Outcome: Ongoing  9/6/2020 1352 by Jeri Ralph RN  Outcome: Ongoing  Goal: Ability to verbalize frustrations and anger appropriately will improve  Description: Ability to verbalize frustrations and anger appropriately will improve  9/6/2020 2216 by Carmen Rolle RN  Outcome: Ongoing  9/6/2020 1352 by Jeri Ralph RN  Outcome: Ongoing  Goal: Absence of angry outbursts  Description: Absence of angry outbursts  9/6/2020 2216 by Carmen Rolle RN  Outcome: Ongoing  9/6/2020 1352 by Jeri Ralph RN  Outcome: Ongoing  Goal: Participates in care planning  Description: Participates in care planning  9/6/2020 2216 by Carmen Rolle RN  Outcome: Ongoing  9/6/2020 1352 by Jeri Ralph RN  Outcome: Ongoing     Problem: Skin Integrity:  Goal: Will show no infection signs and symptoms  Description: Will show no infection signs and symptoms  9/6/2020 2216 by Carmen Rolle RN  Outcome: Ongoing  9/6/2020 1352 by Jeri Ralph RN  Outcome: Ongoing  Goal: Absence of new skin breakdown  Description: Absence of new skin breakdown  9/6/2020 2216 by Carmen Rolle RN  Outcome: Ongoing  9/6/2020 1352 by Jeri Ralph RN  Outcome: Ongoing

## 2020-09-07 NOTE — PROGRESS NOTES
Pt handed this RN her glasses and the nose pad that had come off. Unable to put the nose pad back on. Glasses and nose pad placed back  in pt's room.

## 2020-09-07 NOTE — PROGRESS NOTES
Pt's son called earlier to check on pt. Per son, pt loves old gospel hymns and used to play the piano even after her stroke. Pt then spoke Guilherme William, son, on the phone for approx 1/2 hour.

## 2020-09-07 NOTE — PLAN OF CARE
[address] [place] [address]  Day 3 Interdisciplinary Treatment Plan NOTE    Review Date & Time: 09/07/20  0830    Admission Type:   Admission Type:  Involuntary    Reason for admission:  Reason for Admission: Senile Dementia with Behavioral Disturbances    Patient Diagnosis: Major neurocognitive disorder possibly due to vascular disease, with behavioral disturbance (HCC)     PATIENT STRENGTHS:  Patient Strengths Strengths: Positive Support  Patient Strengths and Limitations:Limitations: Difficult relationships / poor social skills, Multiple barriers to leisure interests, Unrealistic self-view, Tendency to isolate self, Perceives need for assistance with self-care  Addictive Behavior:   Medical Problems:  Past Medical History:   Diagnosis Date    Allergic rhinitis     Contact dermatitis     hands    Contact dermatitis     b/l hands    [facility]    MCA Left parietal & temporal lobes, lentiform nucleus & perventricular white matter    Depression     Diastolic dysfunction     grade 1    Hypothyroidism     Pure hypercholesterolemia     [facility]    Senile dementia with behavioral disturbance (HCC)        Risk:  Fall Risk Total: 131  Sha Scale Sha Scale Score: 15  BVC Total: 3    Status EXAM:   Status and Exam  Normal: No  Facial Expression: Exaggerated  Affect: Unstable  Level of Consciousness: Confused  Mood:Normal: No  Mood: Depressed, Anxious, Labile, Irritable  Motor Activity:Normal: No  Motor Activity: Unusual Posture/Gait, Decreased  Interview Behavior: Impulsive, Irritable  Preception: [place] to Person  Attention:Normal: No  Attention: Unable to Concentrate  Thought Processes: Perseveration  Thought Content:Normal: No  Thought Content: Poverty of Content  Hallucinations: None  Delusions: No  Memory:Normal: No  Memory: Poor Recent, Poor Remote  Insight and Judgment: No  Insight and Judgment: Poor Judgment, Poor Insight, Unrealistic  Present Suicidal Ideation: No  Present Homicidal Ideation: No    Daily Assessment Last Entry:   Daily Sleep (WDL): Within Defined Limits  Patient Currently in Pain: No  Daily Nutrition (WDL): Exceptions to WDL  Appetite Change: Decreased  Barriers to Nutrition: Elderly patient, Cognitive impairment  Level of Assistance: Set up    Patient Monitoring:  Frequency of Checks: 4 times per hour, close    Psychiatric Symptoms:   Depression Symptoms  Depression Symptoms: Impaired concentration, Increased irritability, Crying, Feelings of helplessness  Anxiety Symptoms  Anxiety Symptoms: Obsessions, Unexplained fears  Margarita Symptoms  Margarita Symptoms: No problems reported or observed. Psychosis Symptoms  Delusion Type: No problems reported or observed.     Suicide Risk CSSR-S:  1) Within the past month, have you wished you were dead or wished you could go to sleep and not wake up? : (ADAMARIS)  2) Have you actually had any thoughts of killing yourself? : (ADAMARIS)  6) Have you ever done anything, started to do anything, or prepared to do anything to end your life?: (ADAMARIS)    EDUCATION:   Learner Progress Toward Treatment Goals: Reviewed goals and plan of care    Method: Group    Outcome: No evidence of Learning    PATIENT GOALS: Med titration, compliance with unit programming    PLAN/TREATMENT RECOMMENDATIONS UPDATE: Med titration    Estimated Length of Stay Update:  4 days  Estimated Discharge Date Update: 09/11/20  Discharge criteria: Med titration      SHORT-TERM GOALS UPDATE:   Time frame for Short-Term Goals: 4 days    LONG-TERM GOALS UPDATE:   Time frame for Long-Term Goals: 4 days  Members Present in Team Meeting: See Signature Sheet    Ebony Cohn MSW, LSW
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
## 2020-09-07 NOTE — PLAN OF CARE
Problem: Falls - Risk of:  Goal: Will remain free from falls  Description: Will remain free from falls  9/7/2020 0953 by Gregory Ann RN  Outcome: Ongoing  9/6/2020 2216 by Monika Espino RN  Outcome: Ongoing  Goal: Absence of physical injury  Description: Absence of physical injury  9/7/2020 0953 by Gregory Ann RN  Outcome: Ongoing  9/6/2020 2216 by Monika Espino RN  Outcome: Ongoing     Problem: Anger Management/Homicidal Ideation:  Goal: Able to display appropriate communication and problem solving  Description: Able to display appropriate communication and problem solving  9/7/2020 0953 by Gregory Ann RN  Outcome: Ongoing  9/6/2020 2216 by Monika Espino RN  Outcome: Ongoing  Goal: Ability to verbalize frustrations and anger appropriately will improve  Description: Ability to verbalize frustrations and anger appropriately will improve  9/7/2020 0953 by Gregory Ann RN  Outcome: Ongoing  9/6/2020 2216 by Monika Espino RN  Outcome: Ongoing  Goal: Absence of angry outbursts  Description: Absence of angry outbursts  9/7/2020 0953 by Gregory Ann RN  Outcome: Ongoing  9/6/2020 2216 by Monika Espino RN  Outcome: Ongoing  Goal: Participates in care planning  Description: Participates in care planning  9/7/2020 0953 by Gregory Ann RN  Outcome: Ongoing  9/6/2020 2216 by Monika Espino RN  Outcome: Ongoing     Problem: Skin Integrity:  Goal: Will show no infection signs and symptoms  Description: Will show no infection signs and symptoms  9/7/2020 0953 by Gregory Ann RN  Outcome: Ongoing  9/6/2020 2216 by Monika Espino RN  Outcome: Ongoing  Goal: Absence of new skin breakdown  Description: Absence of new skin breakdown  9/7/2020 0953 by Gregory Ann RN  Outcome: Ongoing  9/6/2020 2216 by Monika Espino RN  Outcome: Ongoing

## 2020-09-08 LAB
FOLATE: 16.2 NG/ML (ref 3.1–17.5)
VITAMIN B-12: 527 PG/ML (ref 211–911)

## 2020-09-08 PROCEDURE — 1240000000 HC EMOTIONAL WELLNESS R&B

## 2020-09-08 PROCEDURE — 6370000000 HC RX 637 (ALT 250 FOR IP): Performed by: PSYCHIATRY & NEUROLOGY

## 2020-09-08 PROCEDURE — 6370000000 HC RX 637 (ALT 250 FOR IP): Performed by: INTERNAL MEDICINE

## 2020-09-08 PROCEDURE — 80164 ASSAY DIPROPYLACETIC ACD TOT: CPT

## 2020-09-08 PROCEDURE — 36415 COLL VENOUS BLD VENIPUNCTURE: CPT

## 2020-09-08 PROCEDURE — 82607 VITAMIN B-12: CPT

## 2020-09-08 PROCEDURE — 99233 SBSQ HOSP IP/OBS HIGH 50: CPT | Performed by: NURSE PRACTITIONER

## 2020-09-08 PROCEDURE — 82746 ASSAY OF FOLIC ACID SERUM: CPT

## 2020-09-08 RX ORDER — LORAZEPAM 2 MG/ML
1 INJECTION INTRAMUSCULAR EVERY 6 HOURS PRN
Status: DISCONTINUED | OUTPATIENT
Start: 2020-09-08 | End: 2020-09-14 | Stop reason: HOSPADM

## 2020-09-08 RX ORDER — HALOPERIDOL 5 MG
5 TABLET ORAL EVERY 6 HOURS PRN
Status: DISCONTINUED | OUTPATIENT
Start: 2020-09-08 | End: 2020-09-14 | Stop reason: HOSPADM

## 2020-09-08 RX ORDER — ESCITALOPRAM OXALATE 10 MG/1
10 TABLET ORAL DAILY
Status: DISCONTINUED | OUTPATIENT
Start: 2020-09-09 | End: 2020-09-14 | Stop reason: HOSPADM

## 2020-09-08 RX ORDER — HALOPERIDOL 5 MG/ML
5 INJECTION INTRAMUSCULAR EVERY 6 HOURS PRN
Status: DISCONTINUED | OUTPATIENT
Start: 2020-09-08 | End: 2020-09-14 | Stop reason: HOSPADM

## 2020-09-08 RX ORDER — DIPHENHYDRAMINE HYDROCHLORIDE 50 MG/ML
50 INJECTION INTRAMUSCULAR; INTRAVENOUS EVERY 6 HOURS PRN
Status: DISCONTINUED | OUTPATIENT
Start: 2020-09-08 | End: 2020-09-14 | Stop reason: HOSPADM

## 2020-09-08 RX ORDER — DIPHENHYDRAMINE HCL 50 MG
50 CAPSULE ORAL EVERY 6 HOURS PRN
Status: DISCONTINUED | OUTPATIENT
Start: 2020-09-08 | End: 2020-09-14 | Stop reason: HOSPADM

## 2020-09-08 RX ORDER — LORAZEPAM 1 MG/1
1 TABLET ORAL EVERY 6 HOURS PRN
Status: DISCONTINUED | OUTPATIENT
Start: 2020-09-08 | End: 2020-09-14 | Stop reason: HOSPADM

## 2020-09-08 RX ADMIN — ACETAMINOPHEN 650 MG: 325 TABLET ORAL at 20:54

## 2020-09-08 RX ADMIN — TRAZODONE HYDROCHLORIDE 100 MG: 100 TABLET ORAL at 20:54

## 2020-09-08 RX ADMIN — PANTOPRAZOLE SODIUM 40 MG: 40 TABLET, DELAYED RELEASE ORAL at 08:24

## 2020-09-08 RX ADMIN — ATORVASTATIN CALCIUM 40 MG: 40 TABLET, FILM COATED ORAL at 20:54

## 2020-09-08 RX ADMIN — ACETAMINOPHEN 650 MG: 325 TABLET ORAL at 08:24

## 2020-09-08 RX ADMIN — CITALOPRAM HYDROBROMIDE 20 MG: 20 TABLET ORAL at 08:24

## 2020-09-08 RX ADMIN — ACETAMINOPHEN 650 MG: 325 TABLET ORAL at 01:15

## 2020-09-08 RX ADMIN — ASPIRIN 81 MG 81 MG: 81 TABLET ORAL at 08:24

## 2020-09-08 RX ADMIN — RISPERIDONE 0.5 MG: 0.5 TABLET ORAL at 11:53

## 2020-09-08 RX ADMIN — RISPERIDONE 1.5 MG: 1 TABLET ORAL at 20:54

## 2020-09-08 RX ADMIN — LEVOTHYROXINE SODIUM 100 MCG: 100 TABLET ORAL at 08:24

## 2020-09-08 RX ADMIN — DIVALPROEX SODIUM 500 MG: 125 CAPSULE, COATED PELLETS ORAL at 20:54

## 2020-09-08 ASSESSMENT — PAIN DESCRIPTION - FREQUENCY: FREQUENCY: INTERMITTENT

## 2020-09-08 ASSESSMENT — PAIN SCALES - PAIN ASSESSMENT IN ADVANCED DEMENTIA (PAINAD)
CONSOLABILITY: 0
NEGVOCALIZATION: 0
BODYLANGUAGE: 0
NEGVOCALIZATION: 0
NEGVOCALIZATION: 0
NEGVOCALIZATION: 2
TOTALSCORE: 0
BREATHING: 0
TOTALSCORE: 4
BREATHING: 0
BODYLANGUAGE: 0
TOTALSCORE: 0
TOTALSCORE: 7
CONSOLABILITY: 0
FACIALEXPRESSION: 1
TOTALSCORE: 0
CONSOLABILITY: 1
NEGVOCALIZATION: 0
NEGVOCALIZATION: 1
BODYLANGUAGE: 1
BREATHING: 0
BODYLANGUAGE: 0
BREATHING: 2
CONSOLABILITY: 0
FACIALEXPRESSION: 0
BODYLANGUAGE: 1
BREATHING: 0
TOTALSCORE: 0
FACIALEXPRESSION: 0
BODYLANGUAGE: 0
FACIALEXPRESSION: 1
CONSOLABILITY: 0
BREATHING: 0
CONSOLABILITY: 1
FACIALEXPRESSION: 0
FACIALEXPRESSION: 0

## 2020-09-08 ASSESSMENT — PAIN SCALES - GENERAL
PAINLEVEL_OUTOF10: 0
PAINLEVEL_OUTOF10: 10
PAINLEVEL_OUTOF10: 7
PAINLEVEL_OUTOF10: 10
PAINLEVEL_OUTOF10: 0
PAINLEVEL_OUTOF10: 0

## 2020-09-08 ASSESSMENT — PAIN DESCRIPTION - ORIENTATION: ORIENTATION: RIGHT

## 2020-09-08 ASSESSMENT — PAIN DESCRIPTION - LOCATION: LOCATION: LEG;HAND

## 2020-09-08 NOTE — PLAN OF CARE
Problem: Falls - Risk of:  Goal: Will remain free from falls  Description: Will remain free from falls  9/7/2020 2322 by Chencho Dimas RN  Outcome: Ongoing  9/7/2020 0953 by Donna Gibbs RN  Outcome: Ongoing  Goal: Absence of physical injury  Description: Absence of physical injury  9/7/2020 2322 by Chencho Dimas RN  Outcome: Ongoing  9/7/2020 0953 by Donna Gibbs RN  Outcome: Ongoing     Problem: Anger Management/Homicidal Ideation:  Goal: Able to display appropriate communication and problem solving  Description: Able to display appropriate communication and problem solving  9/7/2020 2322 by Chencho Dimas RN  Outcome: Ongoing  9/7/2020 0953 by Donna Gibbs RN  Outcome: Ongoing  Goal: Ability to verbalize frustrations and anger appropriately will improve  Description: Ability to verbalize frustrations and anger appropriately will improve  9/7/2020 2322 by Chencho Dimas RN  Outcome: Ongoing  9/7/2020 0953 by Donna Gibbs RN  Outcome: Ongoing  Goal: Absence of angry outbursts  Description: Absence of angry outbursts  9/7/2020 2322 by Chencho Dimas RN  Outcome: Ongoing  9/7/2020 0953 by Donna Gibbs RN  Outcome: Ongoing  Goal: Participates in care planning  Description: Participates in care planning  9/7/2020 2322 by Chencho Dimas RN  Outcome: Ongoing  9/7/2020 0953 by Donna Gibbs RN  Outcome: Ongoing     Problem: Skin Integrity:  Goal: Will show no infection signs and symptoms  Description: Will show no infection signs and symptoms  9/7/2020 2322 by Chencho Dimas RN  Outcome: Ongoing  9/7/2020 0953 by Donna Gibbs RN  Outcome: Ongoing  Goal: Absence of new skin breakdown  Description: Absence of new skin breakdown  9/7/2020 2322 by Chencho Dimas RN  Outcome: Ongoing  9/7/2020 0953 by Donna Gibbs RN  Outcome: Ongoing

## 2020-09-08 NOTE — PROGRESS NOTES
on numeric scale but states she is not depressed or anxious. Patient affect angry and mood depressed. Pt denied any thoughts to harm herself or anyone else. Pt denied any auditory or visiual hallucintations. Patient over the weekend has been agitated requiring several calming medications on Saturday and one Prn dose on Sunday. Risperidone added upon admission. PC to Rosanna ALVARENGA at [facility]. Updated her on patient progress. She reports patient signed a document on 10/5/2019 listing her daughter as [address] and son Evelia Torres as 2nd alternative. She will fax to unit.     No TD noted  AIMS=0   met with pt and treatment team.    Allergies   Allergen Reactions    Penicillins Anaphylaxis     Per family      Sulfa Antibiotics Anaphylaxis     Per family    Sulfacetamide Sodium        Medications Prior to Admission: traZODone (DESYREL) 100 MG tablet, Take 1 tablet by mouth nightly  aspirin 81 MG EC tablet, Take 1 tablet by mouth daily  divalproex (DEPAKOTE ER) 500 MG extended release tablet, Take 1 tablet by mouth daily  ranitidine (ZANTAC) 150 MG tablet, Take 1 tablet by mouth 2 times daily  levothyroxine (SYNTHROID) 100 MCG tablet, Take 1 tablet by mouth daily  citalopram (CELEXA) 20 MG tablet, Take 1 tablet by mouth daily    Past Medical History:   Diagnosis Date    Allergic rhinitis     Contact dermatitis     hands    Contact dermatitis     b/l hands    CVA (cerebral vascular accident) ([address] 07/04/2019    MCA Left parietal & temporal lobes, lentiform nucleus & perventricular white matter    Depression     Diastolic dysfunction     grade 1    Hypothyroidism     Pure hypercholesterolemia     Right hemiparesis ([address] 07/04/2019    Senile dementia with behavioral disturbance ([address]         Patient Active Problem List   Diagnosis    Hypothyroidism    Pure hypercholesterolemia    Cerebrovascular accident (CVA) due to thrombosis of precerebral artery ([address]    Expressive aphasia    Right arm weakness    Right leg weakness    Major depressive disorder, severe (HCC)    [facility] recurrent major depression without psychotic features [facility]    Acute cystitis without hematuria    Major neurocognitive disorder possibly due to vascular disease, with behavioral disturbance ([facility] [facility]       Review of Systems    OBJECTIVE  Vital Signs:  Vitals:    09/08/20 0730   BP: 119/68   Pulse: 83   Resp: 16   Temp: 97.7 °F (36.5 °C)   SpO2: 96%       Labs:  No results found for this or any previous visit (from the past 48 hour(s)). PSYCHIATRIC ASSESSMENT / MENTAL STATUS EXAM:   Vitals: Blood pressure 119/68, pulse 83, temperature 97.7 °F (36.5 °C), temperature source Temporal, resp. rate 16, SpO2 96 %. CONSTITUTIONAL:    Appearance: appears stated age. alert and oriented to person, place, time & situation. no acute distress. Adequate grooming and hygeine. Good eye contact. No prominent physical abnormalities. Attitude: Manner is cooperative and pleasant  Motor: No psychomotor agitation, retardation or abnormal movements noted  Speech: Clearly articulated; normal rate, volume, tone & amount. Language: intact understanding and production  Mood: angry  Affect: mood congruent  Thought Production: Spontaneous. Thought Form: Coherent, linear, logical & goal-directed. No tangentiality or circumstantiality. No flight of ideas or loosening of associations. Thought Content/Perceptions: No BIB, no AVH, no delusion  Insight:questionable  Judgment: questionable  Memory: Immediate, recent, and remote appear intact, though not formally tested. Attention: maintained throughout interview  Fund of knowledge: Average  Gait/Balance: WC with two assist         IMPRESSION:   No change in diagnosis          PLAN:  Psychiatric management:medication initiation and titration, group and individual therapy, safe and theraputic environment.   Status of problem/condition: ?Improving  Medical co-morbidities: [facility], appreciate assistance  Legal Status:Pending  Disposition:estimated LOS: Pending. The treatment team reviewed with the patient the diagnosis and treatment recommendations to include the risks, benefits, and side effects of chosen medications. The patient verbalized understanding and agreed with the treatment regimen as outlined above. The patient was encouraged to participate in groups. Medical records, Labs, Diagnotic tests reviewed  q15 min safety checks for safety  Interval History. Review current labs  Continue current medications  change risperidone to 0.5 mg PO at 1300 and 1.5 mg PO QHS for severe agitation and confusion with psyhosis  Depakote level requested along with Vitamin B 12 and folate levels  Supportive Therapy Provided  Pt had an opportunity to ask questions and address concerns  Pt encouraged to continue therapy group or individual.  Pt was in agreement with treatment plan. The risks benefits and side effects of medications were discussed with the patient, including alternatives and no treatment.     Electronically signed by KALIN Puentes CNP on 9/8/2020 at 10:45 AM

## 2020-09-08 NOTE — GROUP NOTE
Group Therapy Note    Date: 9/8/2020    Group Start Time: 8543  Group End Time: 1600     Number of Participants: 2/5    Type: Exercise/Recreation Group    Group Topic/Objective: Chair Exercises    Notes:  Pt encouraged to attend, pt refused.     Discipline Responsible: Certified Therapeutic Recreation Specialist     Electronically signed by Maico Tripp [place], [place] on 9/8/2020 at 4:09 PM

## 2020-09-08 NOTE — PROGRESS NOTES
Pt seen by therapist for 1:1 session. Pt unwilling to participate in leisure activities, but was agreeable to answering assessment questions. Pt states reason for admission was d/t \"Numbers. \"  With prompting, pt endorsed that she was getting angry at [facility]. Pt noted to answer questions better if she was able to do yes/no or either/or. Pt noted to struggled to identify leisure interests. Therapist encouraged pt to attend groups.      Electronically signed by PRINCE Escobar MA on 9/8/2020 at 12:00 PM

## 2020-09-08 NOTE — BH NOTE
Pt was sitting quietly in w/c in front of TV at the beginning of this shift. Pt was uncooperative with medications at med pass. Pt kept spitting out the applesauce with the crushed meds. It is estimated that 3/4 of crushed medicine was consumed. Much encouragement was needed. Pt was a 2 person assist to get into bed. Once in bed, pt fell asleep quickly. Pt woke up at approximately 0130  uncomfortable in bed and complaining of back pain. Pt was repositioned and 650mg of Tylenol crushed in pudding was administered PO. No issues with pt taking the Tylenol. Pt stated \"Brrrr\" and said \"yes\" when asked if she would like another blanket. At 30 minute pain re-assessment, pt was asleep with RR >12. No BM this shift. Pt is encouraged to drink. Will continue to monitor pt. Pt denies HI,SI, AVH.

## 2020-09-08 NOTE — PLAN OF CARE
Problem: Falls - Risk of:  Goal: Will remain free from falls  Description: Will remain free from falls  9/8/2020 1146 by Darin Harper RN  Outcome: Ongoing  9/7/2020 2322 by Sophia Burkitt, RN  Outcome: Ongoing  Goal: Absence of physical injury  Description: Absence of physical injury  9/8/2020 1146 by Darin Harper RN  Outcome: Ongoing  9/7/2020 2322 by Sophia Burkitt, RN  Outcome: Ongoing     Problem: Anger Management/Homicidal Ideation:  Goal: Able to display appropriate communication and problem solving  Description: Able to display appropriate communication and problem solving  9/8/2020 1146 by Darin Harper RN  Outcome: Ongoing  9/7/2020 2322 by Sophia Burkitt, RN  Outcome: Ongoing  Goal: Ability to verbalize frustrations and anger appropriately will improve  Description: Ability to verbalize frustrations and anger appropriately will improve  9/8/2020 1146 by Darin Harper RN  Outcome: Ongoing  9/7/2020 2322 by Sophia Burkitt, RN  Outcome: Ongoing  Goal: Absence of angry outbursts  Description: Absence of angry outbursts  9/8/2020 1146 by Darin Harper RN  Outcome: Ongoing  9/7/2020 2322 by Sophia Burkitt, RN  Outcome: Ongoing  Goal: Participates in care planning  Description: Participates in care planning  9/8/2020 1146 by Darin Harper RN  Outcome: Ongoing  9/7/2020 2322 by Sophia Burkitt, RN  Outcome: Ongoing     Problem: Skin Integrity:  Goal: Will show no infection signs and symptoms  Description: Will show no infection signs and symptoms  9/8/2020 1146 by Darin Harper RN  Outcome: Ongoing  9/7/2020 2322 by Sophia Burkitt, RN  Outcome: Ongoing  Goal: Absence of new skin breakdown  Description: Absence of new skin breakdown  9/8/2020 1146 by Darin Harper RN  Outcome: Ongoing  9/7/2020 2322 by Sophia Burkitt, RN  Outcome: Ongoing

## 2020-09-09 LAB
DOSE AMOUNT: NORMAL
DOSE TIME: NORMAL
VALPROIC ACID LEVEL: 72.5 UG/ML (ref 50–100)

## 2020-09-09 PROCEDURE — 99233 SBSQ HOSP IP/OBS HIGH 50: CPT | Performed by: NURSE PRACTITIONER

## 2020-09-09 PROCEDURE — 97166 OT EVAL MOD COMPLEX 45 MIN: CPT

## 2020-09-09 PROCEDURE — 6370000000 HC RX 637 (ALT 250 FOR IP): Performed by: INTERNAL MEDICINE

## 2020-09-09 PROCEDURE — 6370000000 HC RX 637 (ALT 250 FOR IP): Performed by: PSYCHIATRY & NEUROLOGY

## 2020-09-09 PROCEDURE — 1240000000 HC EMOTIONAL WELLNESS R&B

## 2020-09-09 PROCEDURE — 6370000000 HC RX 637 (ALT 250 FOR IP): Performed by: NURSE PRACTITIONER

## 2020-09-09 PROCEDURE — 97162 PT EVAL MOD COMPLEX 30 MIN: CPT

## 2020-09-09 PROCEDURE — 80164 ASSAY DIPROPYLACETIC ACD TOT: CPT

## 2020-09-09 PROCEDURE — 36415 COLL VENOUS BLD VENIPUNCTURE: CPT

## 2020-09-09 RX ORDER — PALIPERIDONE 1.5 MG/1
1.5 TABLET, EXTENDED RELEASE ORAL NIGHTLY
Status: DISCONTINUED | OUTPATIENT
Start: 2020-09-09 | End: 2020-09-11

## 2020-09-09 RX ADMIN — LEVOTHYROXINE SODIUM 100 MCG: 100 TABLET ORAL at 08:15

## 2020-09-09 RX ADMIN — ASPIRIN 81 MG 81 MG: 81 TABLET ORAL at 08:15

## 2020-09-09 RX ADMIN — ATORVASTATIN CALCIUM 40 MG: 40 TABLET, FILM COATED ORAL at 20:25

## 2020-09-09 RX ADMIN — PALIPERIDONE 1.5 MG: 1.5 TABLET, EXTENDED RELEASE ORAL at 20:25

## 2020-09-09 RX ADMIN — DIVALPROEX SODIUM 500 MG: 125 CAPSULE, COATED PELLETS ORAL at 20:25

## 2020-09-09 RX ADMIN — ESCITALOPRAM OXALATE 10 MG: 10 TABLET ORAL at 08:15

## 2020-09-09 RX ADMIN — PANTOPRAZOLE SODIUM 40 MG: 40 TABLET, DELAYED RELEASE ORAL at 08:15

## 2020-09-09 RX ADMIN — TRAZODONE HYDROCHLORIDE 100 MG: 100 TABLET ORAL at 20:26
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
## 2020-09-09 NOTE — PLAN OF CARE
Problem: Falls - Risk of:  Goal: Will remain free from falls  Description: Will remain free from falls  9/9/2020 1517 by Rufino Ríos RN  Outcome: Ongoing  9/9/2020 1516 by Rufino Ríos RN  Outcome: Ongoing  Goal: Absence of physical injury  Description: Absence of physical injury  9/9/2020 1517 by Rufino Ríos RN  Outcome: Ongoing  9/9/2020 1516 by Rufino Ríos RN  Outcome: Ongoing     Problem: Anger Management/Homicidal Ideation:  Goal: Able to display appropriate communication and problem solving  Description: Able to display appropriate communication and problem solving  9/9/2020 1517 by Rufino Ríos RN  Outcome: Ongoing  9/9/2020 1516 by Rufino Ríos RN  Outcome: Ongoing  Goal: Ability to verbalize frustrations and anger appropriately will improve  Description: Ability to verbalize frustrations and anger appropriately will improve  9/9/2020 1517 by Rufino Ríos RN  Outcome: Ongoing  9/9/2020 1516 by Rufino Ríos RN  Outcome: Ongoing  Goal: Absence of angry outbursts  Description: Absence of angry outbursts  9/9/2020 1517 by Rufino Ríos RN  Outcome: Ongoing  9/9/2020 1516 by Rufino Ríos RN  Outcome: Ongoing  Goal: Participates in care planning  Description: Participates in care planning  9/9/2020 1517 by Rufino Ríos RN  Outcome: Ongoing  9/9/2020 1516 by Rufino Ríos RN  Outcome: Ongoing     Problem: Skin Integrity:  Goal: Will show no infection signs and symptoms  Description: Will show no infection signs and symptoms  9/9/2020 1517 by Rufino Ríos RN  Outcome: Ongoing  9/9/2020 1516 by Rufino Ríos RN  Outcome: Ongoing  Goal: Absence of new skin breakdown  Description: Absence of new skin breakdown  9/9/2020 1517 by Rufino Ríos RN  Outcome: Ongoing  9/9/2020 1516 by Rufino Ríos RN  Outcome: Ongoing

## 2020-09-09 NOTE — PLAN OF CARE
Problem: Falls - Risk of:  Goal: Will remain free from falls  Description: Will remain free from falls  Outcome: Ongoing  Goal: Absence of physical injury  Description: Absence of physical injury  Outcome: Ongoing     Problem: Anger Management/Homicidal Ideation:  Goal: Able to display appropriate communication and problem solving  Description: Able to display appropriate communication and problem solving  Outcome: Ongoing  Goal: Ability to verbalize frustrations and anger appropriately will improve  Description: Ability to verbalize frustrations and anger appropriately will improve  Outcome: Ongoing  Goal: Absence of angry outbursts  Description: Absence of angry outbursts  Outcome: Ongoing  Goal: Participates in care planning  Description: Participates in care planning  Outcome: Ongoing     Problem: Skin Integrity:  Goal: Will show no infection signs and symptoms  Description: Will show no infection signs and symptoms  Outcome: Ongoing  Goal: Absence of new skin breakdown  Description: Absence of new skin breakdown  Outcome: Ongoing

## 2020-09-09 NOTE — PROGRESS NOTES
Physical Therapy    Facility/Department: [facility]  Initial Assessment    NAME: Kirstin Butts  :   MRN: [id number]    Date of Service: 2020    Discharge Recommendations:  Subacute/Skilled Nursing Facility   PT Equipment Recommendations  Equipment Needed: No    Assessment   Body structures, Functions, Activity limitations: Decreased functional mobility ; Decreased ADL status; Decreased balance;Decreased posture;Decreased ROM; Decreased strength;Decreased safe awareness;Decreased endurance  Assessment: P is 77 yo female who presents with hx of CVA and significant deficits in strength, balance, mobility and overall reduced safety. Recommend skilled PT to address deficits and maximize functional potential.  Treatment Diagnosis: impaired strength, balance, ROM  Prognosis: Fair  Decision Making: Medium Complexity  History: see below  Exam: assessed ROM, strength, mobility  Clinical Presentation: evolving  PT Education: PT Role;Plan of Care  Barriers to Learning: aphasia  REQUIRES PT FOLLOW UP: Yes  Activity Tolerance  Activity Tolerance: Patient Tolerated treatment well       Patient Diagnosis(es): There were no encounter diagnoses. has a past medical history of Allergic rhinitis, Contact dermatitis, Contact dermatitis, CVA (cerebral vascular accident) ([address] Depression, Diastolic dysfunction, Hypothyroidism, Pure hypercholesterolemia, Right hemiparesis ([address] and Senile dementia with behavioral disturbance ([address] has a past surgical history that includes Tonsillectomy and adenoidectomy; Appendectomy; Appendectomy; Tonsillectomy and adenoidectomy; and Colonoscopy.     Restrictions  Restrictions/Precautions  Restrictions/Precautions: General Precautions, Fall Risk  Required Braces or Orthoses?: Yes(per chart p has leg brace)  Vision/Hearing  Vision: Impaired  Hearing: (unable to assess)     Subjective  General  Chart Reviewed: Yes  Patient assessed for rehabilitation services?: Yes  Family / Caregiver Present: No  Follows Commands: Impaired  Other (Comment): Limited by aphasia  General Comment  Comments: P with aphasia with limited conversation throughout session  Subjective  Subjective: \"Thank you, thank you\"  Pain Screening  Patient Currently in Pain: No  Vital Signs  Patient Currently in Pain: No       Orientation  Orientation  Overall Orientation Status: Impaired  Orientation Level: Unable to assess;Oriented to person  Social/Functional History  Social/Functional History  Lives With: Alone(Simultaneous filing. User may not have seen previous data.)  Type of Home: Assisted living(Simultaneous filing. User may not have seen previous data.)  Home Layout: One level(Simultaneous filing. User may not have seen previous data.)  Home Access: Level entry(Simultaneous filing. User may not have seen previous data.)  Bathroom Shower/Tub: Walk-in shower, Shower chair with back(Simultaneous filing. User may not have seen previous data.)  Bathroom Toilet: Bedside commode(Simultaneous filing. User may not have seen previous data.)  Bathroom Equipment: Grab bars in shower, Grab bars around toilet, Shower chair, Hand-held shower(Simultaneous filing. User may not have seen previous data.)  Bathroom Accessibility: Accessible(Simultaneous filing. User may not have seen previous data.)  Home Equipment: Standard walker, Wheelchair-manual(Simultaneous filing. User may not have seen previous data.)  Receives Help From: Other (comment), Family(Simultaneous filing. User may not have seen previous data.)  ADL Assistance: Needs assistance(Simultaneous filing. User may not have seen previous data.)  Homemaking Assistance: Needs assistance(Simultaneous filing. User may not have seen previous data.)  Homemaking Responsibilities: No(Simultaneous filing. User may not have seen previous data.)  Ambulation Assistance: Needs assistance(Simultaneous filing.  User may not have seen previous data.)  Transfer Assistance: Needs assistance(Simultaneous filing. User may not have seen previous data.)  Active : No(Simultaneous filing. User may not have seen previous data.)  Mode of Transportation: Family(Simultaneous filing. User may not have seen previous data.)  Occupation: Retired(Simultaneous filing. User may not have seen previous data.)  Additional Comments: P unable to provide information. Per chart, p required 24 hour caregiving(Simultaneous filing. User may not have seen previous data.)  Cognition   Cognition  Overall Cognitive Status: Exceptions  Arousal/Alertness: Appropriate responses to stimuli  Following Commands: Follows one step commands with increased time; Follows one step commands with repetition  Attention Span: Attends with cues to redirect  Problem Solving: Assistance required to generate solutions;Assistance required to implement solutions;Assistance required to identify errors made  Insights: Decreased awareness of deficits  Initiation: Requires cues for some  Sequencing: Requires cues for some    Objective     Observation/Palpation  Observation: P sitting up in wc in common area    AROM RLE (degrees)  RLE AROM: Exceptions  RLE General AROM: No active ankle movement  PROM LLE (degrees)  LLE PROM: WFL  AROM LLE (degrees)  LLE AROM : WFL  Strength RLE  Strength RLE: Exception  R Hip Flexion: 3+/5  R Knee Extension: 3+/5  R Ankle Dorsiflexion: 0/5  Strength LLE  Strength LLE: Exception  L Hip Flexion: 4+/5  L Knee Extension: 4+/5  L Ankle Dorsiflexion: 4+/5  Tone RLE  RLE Tone: Hypertonic  Tone LLE  LLE Tone: Normotonic  Motor Control  Gross Motor?: Exceptions  Comments: reduced RLE coordination  Sensation  Overall Sensation Status: WFL(p reports full sensation which may not be accurate)     Transfers  Sit to Stand: Moderate Assistance  Stand to sit: Moderate Assistance  Stand Pivot Transfers: Moderate Assistance  Comment: Sit <> stand x 3 reps wit mod A with assist to scoot forward in chair.  P stands with mod A and pivots to chair x 2 reps. Ambulation  Ambulation?: No  Stairs/Curb  Stairs?: No     Balance  Posture: Fair  Sitting - Static: Good  Sitting - Dynamic: Good  Standing - Static: Poor  Standing - Dynamic: Poor        Plan   Plan  Times per week: 3+  Plan weeks: 1 week or until discharge  Current Treatment Recommendations: Strengthening, ROM, Neuromuscular Re-education, Safety Education & Training, Balance Training, Endurance Training, Functional Mobility Training, Transfer Training, Positioning  Safety Devices  Type of devices: Left in chair  Restraints  Initially in place: No    AM-PAC Score  AM-PAC Inpatient Mobility Raw Score : 10 (09/09/20 1406)  AM-PAC Inpatient T-Scale Score : 32.29 (09/09/20 1406)  Mobility Inpatient CMS 0-100% Score: 76.75 (09/09/20 1406)  Mobility Inpatient CMS G-Code Modifier : CL (09/09/20 1406)          Goals  Short term goals  Time Frame for Short term goals: 1 week  Short term goal 1: P will perform scooting in chair with min A. Short term goal 2: P will perform sit <> stand with min A.   Short term goal 3: P will perform stand pivot transfer with min a  Short term goal 4: P will perform bed mobility with min A  Patient Goals   Patient goals : unable to state       Therapy Time   Individual Concurrent Group Co-treatment   Time In       1329   Time Out       1348   Minutes       [address] PT    Electronically signed by Roberto Najera PT on 9/9/2020 at 2:06 PM

## 2020-09-09 NOTE — PROGRESS NOTES
Pt pleasant and cooperative, med compliant. Worked with PT/OT earlier today and was given a shower before dinner after much encouragement. Out for all meals and participated in most groups. Visible on unit most of shift, did not retreat to room other than toileting. New order for long acting injection to be given, not currently stocked in [facility], due time changed to allow for med to arrive.

## 2020-09-09 NOTE — GROUP NOTE
Group Therapy Note    Date: 9/9/2020    Group Start Time: 3849  Group End Time: 1600    Number of Participants: 1/4    Type: Exercise/Recreation Group    Group Topic/Objective: Mental Health Benefits of Exercise Discussion    Notes:  Pt being showered at the time of group.      Discipline Responsible: Certified Therapeutic Recreation Specialist     Electronically signed by Hazel Morley [place], [place] on 9/9/2020 at 4:09 PM

## 2020-09-09 NOTE — PROGRESS NOTES
Reorientation, Equipment Evaluation, Education, & procurement, Self-Care / ADL, Patient/Caregiver Education & Training, Neuromuscular Re-education, Balance Training, Home Management Training, Functional Mobility Training, Safety Education & Training      AM-PAC Score    AM-PAC 6 click short form for inpatient daily activity:   How much help from another person does the patient currently need. .. Unable  Dep A Lot  Max A A Lot   Mod A A Little  Min A A Little   CGA  SBA None   Mod I  Indep  Sup   1. Putting on and taking off regular lower body clothing? [x] 1    [] 2   [] 2   [] 3   [] 3   [] 4      2. Bathing (including washing, rinsing, drying)? [] 1   [x] 2   [] 2 [] 3 [] 3 [] 4   3. Toileting, which includes using toilet, bedpan, or urinal? [] 1    [x] 2   [] 2   [] 3   [] 3   [] 4     4. Putting on and taking off regular upper body clothing? [] 1   [] 2   [x] 2   [] 3   [] 3    [] 4      5. Taking care of personal grooming such as brushing teeth? [] 1   [] 2    [x] 2 [] 3    [] 3   [] 4      6. Eating meals?    [] 1   [] 2   [] 2   [] 3   [x] 3   [] 4      Raw Score: 12/24    [24=0% impaired(CH), 23=1-19%(CI), 20-22=20-39%(CJ), 15-19=40-59%(CK), 10-14=60-79%(CL), 7-9=80-99%(CM), 6=100%(CN)]            Goals  Short term goals  Time Frame for Short term goals: 1 week or until discharge  Short term goal 1: Pt will require min A for stand pivot functional transfers from [facility] to bed, commode, and chair for increased I  Short term goal 2: Pt will complete UB ADLs with min A for increased functional independence  Short term goal 3: Pt will complete LB ADLs with max A for increased functional independence  Short term goal 4: Pt will participate in therex for RUE self PROM, LUE strengthning for increased strength required for transfers and ADL tasks       Therapy Time   Individual Concurrent Group Co-treatment   Time In       1329   Time Out       1347   Minutes       18           Amish Pringle, OT

## 2020-09-10 PROCEDURE — 6370000000 HC RX 637 (ALT 250 FOR IP): Performed by: PSYCHIATRY & NEUROLOGY

## 2020-09-10 PROCEDURE — 99233 SBSQ HOSP IP/OBS HIGH 50: CPT | Performed by: NURSE PRACTITIONER

## 2020-09-10 PROCEDURE — 1240000000 HC EMOTIONAL WELLNESS R&B

## 2020-09-10 PROCEDURE — 6370000000 HC RX 637 (ALT 250 FOR IP): Performed by: NURSE PRACTITIONER

## 2020-09-10 PROCEDURE — 6370000000 HC RX 637 (ALT 250 FOR IP): Performed by: INTERNAL MEDICINE

## 2020-09-10 RX ADMIN — PANTOPRAZOLE SODIUM 40 MG: 40 TABLET, DELAYED RELEASE ORAL at 09:39

## 2020-09-10 RX ADMIN — ATORVASTATIN CALCIUM 40 MG: 40 TABLET, FILM COATED ORAL at 20:17

## 2020-09-10 RX ADMIN — PALIPERIDONE 1.5 MG: 1.5 TABLET, EXTENDED RELEASE ORAL at 20:17

## 2020-09-10 RX ADMIN — ESCITALOPRAM OXALATE 10 MG: 10 TABLET ORAL at 09:37

## 2020-09-10 RX ADMIN — TRAZODONE HYDROCHLORIDE 100 MG: 100 TABLET ORAL at 20:17

## 2020-09-10 RX ADMIN — LEVOTHYROXINE SODIUM 100 MCG: 100 TABLET ORAL at 09:39

## 2020-09-10 RX ADMIN — DIVALPROEX SODIUM 500 MG: 125 CAPSULE, COATED PELLETS ORAL at 20:17

## 2020-09-10 RX ADMIN — ASPIRIN 81 MG 81 MG: 81 TABLET ORAL at 09:37

## 2020-09-10 ASSESSMENT — PAIN SCALES - GENERAL: PAINLEVEL_OUTOF10: 0

## 2020-09-10 NOTE — PROGRESS NOTES
Pt med compliant today, pleasant and cooperative. Able to voice needs without much encouragement and with limited frustration. Ate all meals well after setup. Transferred from w/c to toilet and back with improved leg strength and following directions well. Smiling at times watching television show.

## 2020-09-10 NOTE — GROUP NOTE
Group Therapy Note    Date: 9/10/2020    Group Start Time: 1115  Group End Time: 9513  Group Topic: Psychoeducation    [address] [place], [place]        Group Therapy Note    Attendees: 4/4       Notes:  Pts participated in recreational therapy group; [facility]. Pts and therapist utilized various activities to reminisce about their past and share stories. Purpose was to encourage positive thinking and socialization. Pt arrived to group ~15 minutes late. Pt attentively listened in group, but did not participate in the discussion. If prompted, pt would gesture. Status After Intervention:  Improved    Participation Level:  Active Listener    Participation Quality: Appropriate and Attentive      Speech:  normal      Thought Process/Content: Logical      Affective Functioning: Congruent      Mood: Bright      Level of consciousness:  Alert and Attentive      Response to Learning: Able to verbalize current knowledge/experience and Able to verbalize/acknowledge new learning      Endings: None Reported    Modes of Intervention: Support, Socialization, Exploration and Activity      Discipline Responsible: Certified Therapeutic Recreation Specialist       Signature:  Ivonne Hernandez [place], [place]

## 2020-09-10 NOTE — CARE COORDINATION
LSW called patient's daughter Monika Sanders ([phone]) to update on discharge plan. LSW had spoken to son Reymundo Evans ([phone]) previously, who had suggested [facility] and [facility] (where patient's brother is currently at) as possible SNF placements. LSW informed Monika Sanders that patient had been skilled and informed of these 2 suggestions. Monika Sanders stated to throw these suggestions out as she has been looking into placement near her. LSW explained that equal consideration had to be given to all suggestions as both her and Reymundo Evans are jointly [address] [facility]. Monika Sanders stated that Reymundo Evans does nothing for her mother. LSW asked if there was alternative paperwork stating that she was 1st alternate. Monika Sanders stated there is not. Monika Sanders would like LSW to check into [facility] in [facility]. LSW to begin referrals and check back in when determinations are received. 11:15 AM  LSW called [facility], [facility] ([phone]) and spoke with [facility] concerning possible admission. Referral information faxed to him at [phone] and LSW awaits determination. 11:20 AM  LSW received phone call from Monikaanival Sanders. Monika Sanders verified the names of the 2 facilities brother had suggested. LSW verified names and Monika Sanders stated \"good, I wanted to make sure I contacted the correct ones\". When asked about this, Monika Sanders stated that she called both facilities and told them she is \"not going to pay any money for her mother to stay there. Let's see if they accept her if they won't be paid\". LSW informed that we are looking to place patient on her Medicare. Monika Sanders stated this is only for up to 100 days and her mother's checks go into her bank account. Due to this, she will not be paying for any facilities near her brother. Monika Sanders stated she will contact other SNFs in the area to inform them she will not pay them either.   Nenita Barajas Manager, notified and LSW was instructed to continue working on placement in this area as no facility will accept if they can't guarantee payment. LSW will continue to search in this area. 3:30 PM  LSW came back from group to voicemail from Eduardo frankel. Eduardo frankel states she has spoken to [facility], [facility], [facility], and [facility]. She states none of them will accept patient now that they have been informed of patient's behaviors. Eduardo frankel states to try [facility] in [place] or [facility] in [place].
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
## 2020-09-10 NOTE — GROUP NOTE
Group Therapy Note    Date: 9/10/2020    Group Start Time: 0830  Group End Time: 0900    Number of Participants: 4/4    Type: Morning Goals Group/ Community Meeting    Group Topic/Objective: Set Goal For The Day and to review Unit Rules and Regulations. Patient's Goal:  Pt states \"Practice. \"    Notes:  Pt presented as pleasant and cooperative during group. Pt answered all questions this group. Pt states she is feeling \"good\" and is grateful for staff. Pt reports restless sleep of approximately 3-4 hours. Depression (0-10): 0    Anxiety (0-10): 0    Irritability/Aggitation (0-10): 0    Status After Intervention:  Improved    Participation Level:  Active Listener and Interactive    Participation Quality: Appropriate, Attentive and Sharing    Speech:  normal    Thought Process/Content: Logical    Affective Functioning: Congruent    Mood: Bright    Level of consciousness:  Alert and Attentive    Response to Learning: Able to verbalize current knowledge/experience    Endings: None Reported    Modes of Intervention: Education, Support and Socialization    Discipline Responsible: Certified Therapeutic Recreation Specialist     Electronically signed by Teresa Sarah MA on 9/10/2020 at 9:38 AM

## 2020-09-10 NOTE — PROGRESS NOTES
Patient ate a snack and drank fluids this evening before bed. Social with peers. Medication compliant. Patient fell asleep in her wheelchair and was placed in bed by Unit staff. Patient slept soundly most of the night. No signs of distress. Continue to monitor for safety.

## 2020-09-11 PROCEDURE — 97110 THERAPEUTIC EXERCISES: CPT

## 2020-09-11 PROCEDURE — 6370000000 HC RX 637 (ALT 250 FOR IP): Performed by: NURSE PRACTITIONER

## 2020-09-11 PROCEDURE — 99231 SBSQ HOSP IP/OBS SF/LOW 25: CPT | Performed by: NURSE PRACTITIONER

## 2020-09-11 PROCEDURE — 97535 SELF CARE MNGMENT TRAINING: CPT

## 2020-09-11 PROCEDURE — 6370000000 HC RX 637 (ALT 250 FOR IP): Performed by: INTERNAL MEDICINE

## 2020-09-11 PROCEDURE — 1240000000 HC EMOTIONAL WELLNESS R&B

## 2020-09-11 PROCEDURE — 6370000000 HC RX 637 (ALT 250 FOR IP): Performed by: PSYCHIATRY & NEUROLOGY

## 2020-09-11 RX ORDER — PALIPERIDONE 3 MG/1
3 TABLET, EXTENDED RELEASE ORAL NIGHTLY
Status: DISCONTINUED | OUTPATIENT
Start: 2020-09-11 | End: 2020-09-14 | Stop reason: HOSPADM

## 2020-09-11 RX ORDER — PALIPERIDONE 1.5 MG/1
3 TABLET, EXTENDED RELEASE ORAL NIGHTLY
Status: DISCONTINUED | OUTPATIENT
Start: 2020-09-11 | End: 2020-09-11

## 2020-09-11 RX ADMIN — ASPIRIN 81 MG 81 MG: 81 TABLET ORAL at 08:14

## 2020-09-11 RX ADMIN — PANTOPRAZOLE SODIUM 40 MG: 40 TABLET, DELAYED RELEASE ORAL at 08:27

## 2020-09-11 RX ADMIN — DIVALPROEX SODIUM 500 MG: 125 CAPSULE, COATED PELLETS ORAL at 20:15

## 2020-09-11 RX ADMIN — TRAZODONE HYDROCHLORIDE 100 MG: 100 TABLET ORAL at 20:14

## 2020-09-11 RX ADMIN — PALIPERIDONE 3 MG: 3 TABLET, EXTENDED RELEASE ORAL at 20:14

## 2020-09-11 RX ADMIN — ATORVASTATIN CALCIUM 40 MG: 40 TABLET, FILM COATED ORAL at 20:14

## 2020-09-11 RX ADMIN — LEVOTHYROXINE SODIUM 100 MCG: 100 TABLET ORAL at 08:27

## 2020-09-11 RX ADMIN — ESCITALOPRAM OXALATE 10 MG: 10 TABLET ORAL at 08:15

## 2020-09-11 ASSESSMENT — PAIN SCALES - PAIN ASSESSMENT IN ADVANCED DEMENTIA (PAINAD)
FACIALEXPRESSION: 0
NEGVOCALIZATION: 0
BREATHING: 0
CONSOLABILITY: 0
BODYLANGUAGE: 0
TOTALSCORE: 0

## 2020-09-11 ASSESSMENT — PAIN SCALES - GENERAL
PAINLEVEL_OUTOF10: 0
PAINLEVEL_OUTOF10: 0

## 2020-09-11 NOTE — PLAN OF CARE
Problem: Falls - Risk of:  Goal: Will remain free from falls  Description: Will remain free from falls  9/11/2020 1011 by Reynaldo Rodríguez RN  Outcome: Ongoing  9/10/2020 2141 by Nikko Montelongo RN  Outcome: Ongoing  Goal: Absence of physical injury  Description: Absence of physical injury  9/11/2020 1011 by Reynaldo Rodríguez RN  Outcome: Ongoing  9/10/2020 2141 by Nikko Montelongo RN  Outcome: Ongoing     Problem: Anger Management/Homicidal Ideation:  Goal: Able to display appropriate communication and problem solving  Description: Able to display appropriate communication and problem solving  9/11/2020 1011 by Reynaldo Rodríguez RN  Outcome: Ongoing  9/10/2020 2141 by Nikko Montelongo RN  Outcome: Ongoing  Goal: Ability to verbalize frustrations and anger appropriately will improve  Description: Ability to verbalize frustrations and anger appropriately will improve  9/11/2020 1011 by Reynaldo Rodríguez RN  Outcome: Ongoing  9/10/2020 2141 by Nikko Montelongo RN  Outcome: Ongoing  Goal: Absence of angry outbursts  Description: Absence of angry outbursts  9/11/2020 1011 by Reynaldo Rodríguez RN  Outcome: Ongoing  9/10/2020 2141 by Nikko Montelongo RN  Outcome: Ongoing  Goal: Participates in care planning  Description: Participates in care planning  9/11/2020 1011 by Reynaldo Rodríguez RN  Outcome: Ongoing  9/10/2020 2141 by Nikko Montelongo RN  Outcome: Ongoing     Problem: Skin Integrity:  Goal: Will show no infection signs and symptoms  Description: Will show no infection signs and symptoms  9/11/2020 1011 by Reynaldo Rodríguez RN  Outcome: Ongoing  9/10/2020 2141 by Nikko Montelongo RN  Outcome: Ongoing  Goal: Absence of new skin breakdown  Description: Absence of new skin breakdown  9/11/2020 1011 by Reynaldo Rodríguez RN  Outcome: Ongoing  9/10/2020 2141 by Nikko Montelongo RN  Outcome: Ongoing

## 2020-09-11 NOTE — CARE COORDINATION
LSW called [facility] ([phone]) to check on making patient referral.  Message left on the admissions voicemail. 10:30 AM  LSW called [facility] ([phone]) to inquire on patient referral.  Mary Beth Sowmya is still waiting for corporate response, but states he believes they will deny due to suicidality. LSW to continue seeking placement.

## 2020-09-11 NOTE — BH NOTE
Assessment complete. Patient with expressive aphasia, right arm and leg deficits from previous stroke. Denies anxiety or depression, denies SI/HI/AVH. Snack accepted, watched TV awhile with peers. Called daughter Becky Argueta per patients request and she was able to talk with her before bed. Medication compliant.

## 2020-09-11 NOTE — GROUP NOTE
Group Therapy Note    Date: 9/11/2020    Group Start Time: 0830  Group End Time: 0845    Number of Participants: 3/3    Type: Morning Goals Group/ Community Meeting    Group Topic/Objective: Set Goal For The Day and to review Unit Rules and Regulations. Patient's Goal:  Pt states her goal is \"Shower. \"    Notes:  Pt presented as pleasant and cooperative during group. Pt states she is feeling \"good\" and is grateful for family. Pt reports restful sleep of ~8 hours. Depression (0-10): 0    Anxiety (0-10): 0    Irritability/Aggitation (0-10): 0    Status After Intervention:  Improved    Participation Level:  Active Listener and Interactive    Participation Quality: Appropriate, Attentive and Sharing    Speech:  normal    Thought Process/Content: Logical    Affective Functioning: Congruent    Mood: Bright    Level of consciousness:  Alert and Attentive    Response to Learning: Able to verbalize current knowledge/experience    Endings: None Reported    Modes of Intervention: Education, Support and Socialization    Discipline Responsible: Certified Therapeutic Recreation Specialist     Electronically signed by Kym Lizarraga MA on 9/11/2020 at 9:40 AM

## 2020-09-11 NOTE — PROGRESS NOTES
Patient up in dining/tv room all day. Participated in groups. Compliant with all medications. Enjoyed music time, observed dancing in her seat. Son called in to check on her. Participated in OT and PT. Pt pleasant, calm and cooperative all shift.

## 2020-09-11 NOTE — PLAN OF CARE
[address] [place] [address] Interdisciplinary Treatment Plan Note     Review Date & Time: 09/11/20 0830    Admission Type:   Admission Type:  Voluntary    Reason for admission:  Reason for Admission: Senile Dementia with Behavioral Disturbances    Patient Diagnosis: Major neurocognitive disorder possibly due to vascular disease, with behavioral disturbance ([facility]     PATIENT STRENGTHS:  Patient Strengths:Strengths: Positive Support  Patient Strengths and Limitations:Limitations: Difficult relationships / poor social skills, Multiple barriers to leisure interests, Unrealistic self-view, Tendency to isolate self, Perceives need for assistance with self-care  Addictive Behavior:   Medical Problems:   Past Medical History:   Diagnosis Date    Allergic rhinitis     Contact dermatitis     hands    Contact dermatitis     b/l hands    CVA (cerebral vascular accident) ([facility] 07/04/2019    MCA Left parietal & temporal lobes, lentiform nucleus & perventricular white matter    Depression     Diastolic dysfunction     grade 1    Hypothyroidism     Pure hypercholesterolemia     Right hemiparesis ([facility] 07/04/2019    Senile dementia with behavioral disturbance ([facility]        Risk:  Fall RiskTotal: 84  Sha Scale Sha Scale Score: 16  BVC Total: 1    Status EXAM:   Status and Exam  Normal: No  Facial Expression: Brightened  Affect: Appropriate  Level of Consciousness: Alert  Mood:Normal: Yes  Mood: Depressed  Motor Activity:Normal: Yes  Motor Activity: Decreased, Unusual Posture/Gait  Interview Behavior: Cooperative  Preception: Others(See Comment), Roslyn to Person(Expressive aphasia)  Attention:Normal: Yes  Attention: Unable to Concentrate  Thought Processes: Loose Assoc.   Thought Content:Normal: No  Thought Content: Poverty of Content  Hallucinations: None  Delusions: No  Memory:Normal: No  Memory: Poor Remote, Poor Recent  Insight and Judgment: No  Insight and Judgment: Unmotivated  Present Suicidal Ideation:

## 2020-09-11 NOTE — PLAN OF CARE
Problem: Falls - Risk of:  Goal: Will remain free from falls  Description: Will remain free from falls  9/11/2020 1944 by Lonny Falcon RN  Outcome: Ongoing  9/11/2020 1011 by Jordon Leung RN  Outcome: Ongoing  Goal: Absence of physical injury  Description: Absence of physical injury  9/11/2020 1944 by Lonny Falcon RN  Outcome: Ongoing  9/11/2020 1011 by Jordon Leung RN  Outcome: Ongoing     Problem: Anger Management/Homicidal Ideation:  Goal: Able to display appropriate communication and problem solving  Description: Able to display appropriate communication and problem solving  9/11/2020 1944 by Lonny Falcon RN  Outcome: Ongoing  9/11/2020 1011 by Jordon Leung RN  Outcome: Ongoing  Goal: Ability to verbalize frustrations and anger appropriately will improve  Description: Ability to verbalize frustrations and anger appropriately will improve  9/11/2020 1944 by Lonny Falcon RN  Outcome: Ongoing  9/11/2020 1011 by Jordon Leung RN  Outcome: Ongoing  Goal: Absence of angry outbursts  Description: Absence of angry outbursts  9/11/2020 1944 by Lonny Falcon RN  Outcome: Ongoing  9/11/2020 1011 by Jordon Leung RN  Outcome: Ongoing  Goal: Participates in care planning  Description: Participates in care planning  9/11/2020 1944 by Lonny Falcon RN  Outcome: Ongoing  9/11/2020 1011 by Jordon Leung RN  Outcome: Ongoing     Problem: Skin Integrity:  Goal: Will show no infection signs and symptoms  Description: Will show no infection signs and symptoms  9/11/2020 1944 by Lonny Falcon RN  Outcome: Ongoing  9/11/2020 1011 by Jordon Leung RN  Outcome: Ongoing  Goal: Absence of new skin breakdown  Description: Absence of new skin breakdown  9/11/2020 1944 by Lonny Falcon RN  Outcome: Ongoing  9/11/2020 1011 by Jordon Leung RN  Outcome: Ongoing

## 2020-09-11 NOTE — PROGRESS NOTES
Attempted to complete 1:1 session with pt at ~1405. Pt sleeping. Therapist will attempt again at a later time.      Electronically signed by Peter Mohamud MA on 9/11/2020 at 2:06 PM

## 2020-09-11 NOTE — PROGRESS NOTES
User may not have seen previous data.)  Bathroom Equipment: Grab bars in shower, Grab bars around toilet, Shower chair, Hand-held shower(Simultaneous filing. User may not have seen previous data.)  Bathroom Accessibility: Accessible(Simultaneous filing. User may not have seen previous data.)  Home Equipment: Standard walker, Wheelchair-manual(Simultaneous filing. User may not have seen previous data.)  Receives Help From: Other (comment), Family(Simultaneous filing. User may not have seen previous data.)  ADL Assistance: Needs assistance(Simultaneous filing. User may not have seen previous data.)  Homemaking Assistance: Needs assistance(Simultaneous filing. User may not have seen previous data.)  Homemaking Responsibilities: No(Simultaneous filing. User may not have seen previous data.)  Ambulation Assistance: Needs assistance(Simultaneous filing. User may not have seen previous data.)  Transfer Assistance: Needs assistance(Simultaneous filing. User may not have seen previous data.)  Active : No(Simultaneous filing. User may not have seen previous data.)  Mode of Transportation: Family(Simultaneous filing. User may not have seen previous data.)  Occupation: Retired(Simultaneous filing. User may not have seen previous data.)  Additional Comments: P unable to provide information. Per chart, p required 24 hour caregiving(Simultaneous filing. User may not have seen previous data.)  Short term goals  Time Frame for Short term goals: 1 week  Short term goal 1: P will perform scooting in chair with min A. Short term goal 2: P will perform sit <> stand with min A.   Short term goal 3: P will perform stand pivot transfer with min a  Short term goal 4: P will perform bed mobility with min A       Electronically signed by:    Lyons Standard, PT  9/11/2020, 2:47 PM

## 2020-09-11 NOTE — PROGRESS NOTES
Impaired cognition and speech  Plan for Next Session:    Exercise or activities or transfers  Time in:  1030  Time out: 1100  Timed treatment minutes: 30  Total treatment time: 30  Electronically signed by:    Jonathan Sharp,   9/11/2020, 11:16 AM    Previously filed values:     Short term goals  Time Frame for Short term goals: 1 week or until discharge  Short term goal 1: Pt will require min A for stand pivot functional transfers from [facility] to bed, commode, and chair for increased I  Short term goal 2: Pt will complete UB ADLs with min A for increased functional independence  Short term goal 3: Pt will complete LB ADLs with max A for increased functional independence  Short term goal 4: Pt will participate in therex for RUE self PROM, LUE strengthning for increased strength required for transfers and ADL tasks

## 2020-09-11 NOTE — PROGRESS NOTES
Psychiatric Progress Note    Michael Judd  [id number]  09/11/20    CHIEF COMPLAINT: I am depressed    HPI: Michael Judd an 77 yo  female that presents depression, suicidal gestures and aggressive behavior.   She has multiple outburst and is gone through multiple caregivers over the last few months and there fore was placed in [address]. While there patient has become more aggressive causing fear in staff. She has been reporting suicidal thoughts and having behavioral issues. Patient admitted to [facility]. Patient has history of being on SBU this year in 3/2020. Patient POA, daughter, provided voluntary consent for admission. Note initial documentation stated patient was threatening self with knife. This is now known to not be true. Pt remains in agreement with treatment plan. Pt Continues to deny and side-effects to current medications. Pt was polite and cordal during the interview process. Has been taking medications and has been compliant with treatment. Tolerating medications without side effect complaints. Pt noted she is doing \"beautiful. \"   She was smiling and more verbal; Bright and reactive. Attention and focussed improved. Pt noted she is sleeping \"great. Tonye Cogan about 8 hours last night. Patient has been taking her medications with no issues. Pt noted her apptetite is good. Pt unable to rate depression or anxiety on numeric scale. Pt denied any thoughts to harm herself or anyone else. Pt denied any auditory or visual hallucintations. PC to Rosanna ALVARENGA at [address]. Updated her on patient progress. Discussed patient's improvement with delusions and paranoia. Patient engaging with staff and attending groups. Patient pleasant and cooperative. DON aware patient plan is to return to facility on Monday. Discussed patient obtaining Paliperidone OLIVER with patient's daughter who is in agreement.  Also discussed PT/OT eval which will determine LOC at 96%       Labs:  No results found for this or any previous visit (from the past 48 hour(s)). PSYCHIATRIC ASSESSMENT / MENTAL STATUS EXAM:   Vitals: Blood pressure 125/73, pulse 83, temperature 96.8 °F (36 °C), temperature source Temporal, resp. rate 12, SpO2 96 %. CONSTITUTIONAL:    Appearance: appears stated age. alert and oriented to person, place, time & situation. no acute distress. Adequate grooming and hygeine. Good eye contact. No prominent physical abnormalities. Attitude: Manner is cooperative and pleasant  Motor: No psychomotor agitation, retardation or abnormal movements noted  Speech: Clearly articulated; normal rate, volume, tone & amount. Language: intact understanding and production  Mood: improved- happy  Affect: mood congruent  Thought Production: Spontaneous. Thought Form: Coherent, linear, logical & goal-directed. No tangentiality or circumstantiality. No flight of ideas or loosening of associations. Thought Content/Perceptions: No BIB, no AVH, no delusion  Insight:questionable  Judgment: questionable  Memory: Immediate, recent, and remote appear intact, though not formally tested. Attention: maintained throughout interview  Fund of knowledge: Average  Gait/Balance: WC with two assist         IMPRESSION:    Neurocognitive D/O  With agitation and aggression  MDD severe recrrent         PLAN:  Psychiatric management:medication initiation and titration, group and individual therapy, safe and theraputic environment. Status of problem/condition: ?Improving  Medical co-morbidities: [facility], appreciate assistance  Legal Status:Pending  Disposition:estimated LOS: Pending. The treatment team reviewed with the patient the diagnosis and treatment recommendations to include the risks, benefits, and side effects of chosen medications. The patient verbalized understanding and agreed with the treatment regimen as outlined above.   The patient was encouraged to participate in groups. Medical records, Labs, Diagnotic tests reviewed  q15 min safety checks for safety  Interval History. Review current labs  Continue current medications  change risperidone to 0.5 mg PO at 1300 and 1.5 mg PO QHS for severe agitation and confusion with psyhosis  Depakote level requested along with Vitamin B 12 and folate levels  Supportive Therapy Provided  Pt had an opportunity to ask questions and address concerns  Pt encouraged to continue therapy group or individual.  Pt was in agreement with treatment plan. The risks benefits and side effects of medications were discussed with the patient, including alternatives and no treatment.     Electronically signed by KALIN Baxter CNP on 9/11/2020 at 10:20 AM

## 2020-09-11 NOTE — GROUP NOTE
Group Therapy Note    Date: 9/11/2020    Group Start Time: 1000  Group End Time: 8405    Number of Participants: 2/3    Type: Spirituality    Group Topic/Objective: Religous discussion with [employer]. Notes:  Pt participated actively in the group. Pt noted to socialize well with peer and . Status After Intervention:  Improved    Participation Level:  Active Listener and Interactive    Participation Quality: Appropriate, Attentive and Sharing    Speech:  normal    Thought Process/Content: Logical    Affective Functioning: Congruent    Mood: Bright    Level of consciousness:  Alert and Attentive    Response to Learning: Able to verbalize current knowledge/experience and Able to verbalize/acknowledge new learning    Endings: None Reported    Modes of Intervention: Education, Support and Socialization    Discipline Responsible: Certified Therapeutic Recreation Specialist     Electronically signed by Kayli Biggs MA on 9/11/2020 at 10:35 AM

## 2020-09-12 PROCEDURE — U0002 COVID-19 LAB TEST NON-CDC: HCPCS

## 2020-09-12 PROCEDURE — 99233 SBSQ HOSP IP/OBS HIGH 50: CPT | Performed by: NURSE PRACTITIONER

## 2020-09-12 PROCEDURE — 6370000000 HC RX 637 (ALT 250 FOR IP): Performed by: PSYCHIATRY & NEUROLOGY

## 2020-09-12 PROCEDURE — 6370000000 HC RX 637 (ALT 250 FOR IP): Performed by: NURSE PRACTITIONER

## 2020-09-12 PROCEDURE — 6370000000 HC RX 637 (ALT 250 FOR IP): Performed by: INTERNAL MEDICINE

## 2020-09-12 PROCEDURE — 1240000000 HC EMOTIONAL WELLNESS R&B

## 2020-09-12 RX ADMIN — ATORVASTATIN CALCIUM 40 MG: 40 TABLET, FILM COATED ORAL at 20:04

## 2020-09-12 RX ADMIN — ACETAMINOPHEN 650 MG: 325 TABLET ORAL at 16:42

## 2020-09-12 RX ADMIN — TRAZODONE HYDROCHLORIDE 100 MG: 100 TABLET ORAL at 20:04

## 2020-09-12 RX ADMIN — PANTOPRAZOLE SODIUM 40 MG: 40 TABLET, DELAYED RELEASE ORAL at 08:48

## 2020-09-12 RX ADMIN — ASPIRIN 81 MG 81 MG: 81 TABLET ORAL at 08:48

## 2020-09-12 RX ADMIN — LEVOTHYROXINE SODIUM 100 MCG: 100 TABLET ORAL at 08:48

## 2020-09-12 RX ADMIN — ESCITALOPRAM OXALATE 10 MG: 10 TABLET ORAL at 08:48

## 2020-09-12 RX ADMIN — DIVALPROEX SODIUM 500 MG: 125 CAPSULE, COATED PELLETS ORAL at 20:03

## 2020-09-12 RX ADMIN — PALIPERIDONE 3 MG: 3 TABLET, EXTENDED RELEASE ORAL at 20:04

## 2020-09-12 ASSESSMENT — PAIN SCALES - GENERAL
PAINLEVEL_OUTOF10: 9
PAINLEVEL_OUTOF10: 0
PAINLEVEL_OUTOF10: 5
PAINLEVEL_OUTOF10: 0

## 2020-09-12 NOTE — PROGRESS NOTES
Psychiatric Progress Note    Lauri Galvan  [id number]  09/12/20    CHIEF COMPLAINT: I am depressed    HPI: Lauri Galvan an 75 yo  female that presents depression, suicidal gestures and aggressive behavior.   She has multiple outburst and is gone through multiple caregivers over the last few months and there fore was placed in [facility] [address]. While there patient has become more aggressive causing fear in staff. She has been reporting suicidal thoughts and having behavioral issues. Patient admitted to [facility]. Patient has history of being on [facility] this year in 3/2020. Patient POA, daughter, provided voluntary consent for admission. Note initial documentation stated patient was threatening self with knife. This is now known to not be true. Pt remains in agreement with treatment plan. Pt Continues to deny and side-effects to current medications. Pt was polite and cordal during the interview process. Has been taking medications and has been compliant with treatment. Tolerating medications without side effect complaints. Pt noted she is doing \"fine. \"   She was smiling and more verbal; Bright and reactive. Attention and focussed improved. Pt noted she is slept \"really well. \" Patient has been taking her medications with no issues. Pt noted her appetite is good. Pt denies depression and anxiety. Pt denied any thoughts to harm herself or anyone else. Pt denied any auditory or visual hallucinations. Phone call to Gabrielle Abbott at [facility] [facility] on Friday. Updated her on patient progress. Discussed patient's improvement with delusions and paranoia. Patient engaging with staff and attending groups. Patient pleasant and cooperative. DON aware patient plan is to return to facility on Monday. Discussed patient obtaining Paliperidone OLIVER with patient's daughter who is in agreement. Also discussed PT/OT eval which will determine LOC at [facility].     Plan:  Patient had Invega shot yesterday evening. Second injection scheduled for Monday. Will continue oral paliperidone 3 mg for 14 days and then stop.     No TD noted  AIMS=0  Met with pt and treatment team.    Allergies   Allergen Reactions    Penicillins Anaphylaxis     Per family      Sulfa Antibiotics Anaphylaxis     Per family    Sulfacetamide Sodium        Medications Prior to Admission: traZODone (DESYREL) 100 MG tablet, Take 1 tablet by mouth nightly  aspirin 81 MG EC tablet, Take 1 tablet by mouth daily  divalproex (DEPAKOTE ER) 500 MG extended release tablet, Take 1 tablet by mouth daily  ranitidine (ZANTAC) 150 MG tablet, Take 1 tablet by mouth 2 times daily  levothyroxine (SYNTHROID) 100 MCG tablet, Take 1 tablet by mouth daily  citalopram (CELEXA) 20 MG tablet, Take 1 tablet by mouth daily    Past Medical History:   Diagnosis Date    Allergic rhinitis     Contact dermatitis     hands    Contact dermatitis     b/l hands    CVA (cerebral vascular accident) ([address] 07/04/2019    MCA Left parietal & temporal lobes, lentiform nucleus & perventricular white matter    Depression     Diastolic dysfunction     grade 1    Hypothyroidism     Pure hypercholesterolemia     Right hemiparesis ([address] 07/04/2019    Senile dementia with behavioral disturbance ([address]         Patient Active Problem List   Diagnosis    Hypothyroidism    Pure hypercholesterolemia    Cerebrovascular accident (CVA) due to thrombosis of precerebral artery ([address]    Expressive aphasia    Right arm weakness    Right leg weakness    Major depressive disorder, severe ([address]    Severe recurrent major depression without psychotic features ([address]    Acute cystitis without hematuria    Major neurocognitive disorder possibly due to vascular disease, with behavioral disturbance ([address]       Review of Systems    OBJECTIVE  Vital Signs:  Vitals:    09/12/20 0800   BP: 120/79   Pulse: 91   Resp: 16   Temp: 97.5 °F (36.4 °C)   SpO2: 97%       Labs:  No results found for this or any previous visit (from the past 48 hour(s)). PSYCHIATRIC ASSESSMENT / MENTAL STATUS EXAM:   Vitals: Blood pressure 120/79, pulse 91, temperature 97.5 °F (36.4 °C), temperature source Temporal, resp. rate 16, height 5' 2\" (1.575 m), weight 130 lb (59 kg), SpO2 97 %. CONSTITUTIONAL:    Appearance: appears stated age. alert and oriented to person, place, time & situation. no acute distress. Adequate grooming and hygeine. Good eye contact. No prominent physical abnormalities. Attitude: Manner is cooperative and pleasant  Motor: No psychomotor agitation, retardation or abnormal movements noted  Speech: Clearly articulated; normal rate, volume, tone & amount. Language: intact understanding and production  Mood: improved- happy  Affect: mood congruent  Thought Production: Spontaneous. Thought Form: Coherent, linear, logical & goal-directed. No tangentiality or circumstantiality. No flight of ideas or loosening of associations. Thought Content/Perceptions: No BIB, no AVH, no delusion  Insight:questionable  Judgment: questionable  Memory: Immediate, recent, and remote appear intact, though not formally tested. Attention: maintained throughout interview  Fund of knowledge: Average  Gait/Balance: WC with two assist         IMPRESSION:    Neurocognitive D/O  With agitation and aggression  MDD severe recrrent         PLAN:  Psychiatric management:medication initiation and titration, group and individual therapy, safe and theraputic environment. Status of problem/condition: Improving  Medical co-morbidities: [facility], appreciate assistance  Legal Status:Pending  Disposition:estimated LOS: Pending. The treatment team reviewed with the patient the diagnosis and treatment recommendations to include the risks, benefits, and side effects of chosen medications. The patient verbalized understanding and agreed with the treatment regimen as outlined above.   The patient was encouraged to participate in groups. Medical records, Labs, Diagnotic tests reviewed  q15 min safety checks for safety  Interval History. Review current labs  Continue current medications  change risperidone to 0.5 mg PO at 1300 and 1.5 mg PO QHS for severe agitation and confusion with psyhosis  Depakote level requested along with Vitamin B 12 and folate levels  Supportive Therapy Provided  Pt had an opportunity to ask questions and address concerns  Pt encouraged to continue therapy group or individual.  Pt was in agreement with treatment plan. The risks benefits and side effects of medications were discussed with the patient, including alternatives and no treatment.     Electronically signed by KALIN Garcia CNP on 9/12/2020 at 10:42 AM

## 2020-09-12 NOTE — GROUP NOTE
Group Therapy Note    Date: 9/12/2020    Group Start Time: 7755  Group End Time: 1600    Number of Participants: 2/3    Type: Exercise/Recreation Group    Group Topic/Objective: Balloon Noodle Toss and Chair Exercises    Notes:  Pt minimally engaged in group. Pt would at times refused to hit the balloon even when it was right in front of her. When moved onto chair exercises, pt minimally participated and instead spent majority of time licking her hand or picking food out of her mouth and throwing it on the floor.      Status After Intervention:  Unchanged    Participation Level: Minimal    Participation Quality: Inappropriate and Resistant    Speech:  normal    Thought Process/Content: Logical    Affective Functioning: Congruent    Mood: irritable    Level of consciousness:  Inattentive    Response to Learning: Resistant    Endings: None Reported    Modes of Intervention: Activity and Movement    Discipline Responsible: Certified Therapeutic Recreation Specialist     Electronically signed by Adin Weiner MA on 9/12/2020 at 4:07 PM Rectal exam deferred

## 2020-09-12 NOTE — GROUP NOTE
Group Therapy Note    Date: 9/12/2020    Group Start Time: 1330  Group End Time: 1400  Group Topic: Recreational    [address]    PRINCE Pastor MA        Group Therapy Note    Attendees: 2/3       Notes:  Pts participated in recreational therapy group; Painting and Music Activity. Pts were encouraged to engage in a leisure activity to promote socialization, positive mood, and coping with negative emotions. Pt attended group and then refused to participate. Pt rolled herself away from the table and refused to engage with therapist or peer. Pt did not respond to encouragement/prompting.      Status After Intervention:  Unchanged    Participation Level: None    Participation Quality: Inappropriate and Resistant      Speech:  loud      Thought Process/Content: Logical      Affective Functioning: Exaggerated      Mood: elevated      Level of consciousness:  Inattentive      Response to Learning: Resistant      Endings: None Reported    Modes of Intervention: Socialization and Activity      Discipline Responsible: Certified Therapeutic Recreation Specialist       Signature:  Saeid Ruvalcaba

## 2020-09-12 NOTE — GROUP NOTE
Group Therapy Note    Date: 9/12/2020    Group Start Time: 9188  Group End Time: 1600    Number of Participants: 2/3    Type: Exercise/Recreation Group    Group Topic/Objective: Balloon Noodle Toss and Chair Exercises    Notes:  Pt minimally engaged in group. Pt would at times refused to hit the balloon even when it was right in front of her. When moved onto chair exercises, pt minimally participated and instead spent majority of time licking her hand or picking food out of her mouth and throwing it on the floor.      Status After Intervention:  Unchanged    Participation Level: Minimal    Participation Quality: Inappropriate and Resistant    Speech:  normal    Thought Process/Content: Logical    Affective Functioning: Congruent    Mood: irritable    Level of consciousness:  Preoccupied    Response to Learning: Resistant    Endings: None Reported    Modes of Intervention: Activity and Movement    Discipline Responsible: Certified Therapeutic Recreation Specialist     Electronically signed by YoutegoPRINCE Harper MA on 9/12/2020 at 4:20 PM

## 2020-09-12 NOTE — PROGRESS NOTES
Assessment completed. Pt denies depression, anxiety, SI/HI, AVH. Pt out in the common area for most of the day, did participate in group. Pt apparently in constipated. RN found her on the toilet with left hand covered in poop. After being cleaned up, pt again attempting to reach into pants. Educated on not reaching hands into pants in common areas. Pt refuses miralax and prune juice. Pt requested milk. RN provided this.

## 2020-09-12 NOTE — BH NOTE
Assessment complete. Patient with expressive aphasia, right arm and leg deficits from previous stroke. Denies anxiety or depression, denies SI/HI/AVH. Snack accepted, watched TV for awhile. Medication compliant. Slept well during the night. Will continue to monitor.

## 2020-09-13 PROCEDURE — 1240000000 HC EMOTIONAL WELLNESS R&B

## 2020-09-13 PROCEDURE — 6370000000 HC RX 637 (ALT 250 FOR IP): Performed by: NURSE PRACTITIONER

## 2020-09-13 PROCEDURE — 99233 SBSQ HOSP IP/OBS HIGH 50: CPT | Performed by: NURSE PRACTITIONER

## 2020-09-13 PROCEDURE — 6370000000 HC RX 637 (ALT 250 FOR IP): Performed by: INTERNAL MEDICINE

## 2020-09-13 PROCEDURE — 6370000000 HC RX 637 (ALT 250 FOR IP): Performed by: PSYCHIATRY & NEUROLOGY

## 2020-09-13 RX ORDER — DOCUSATE SODIUM 100 MG/1
100 CAPSULE, LIQUID FILLED ORAL DAILY PRN
Status: DISCONTINUED | OUTPATIENT
Start: 2020-09-13 | End: 2020-09-14 | Stop reason: HOSPADM

## 2020-09-13 RX ADMIN — LEVOTHYROXINE SODIUM 100 MCG: 100 TABLET ORAL at 07:54

## 2020-09-13 RX ADMIN — DIVALPROEX SODIUM 500 MG: 125 CAPSULE, COATED PELLETS ORAL at 20:40

## 2020-09-13 RX ADMIN — ATORVASTATIN CALCIUM 40 MG: 40 TABLET, FILM COATED ORAL at 20:40

## 2020-09-13 RX ADMIN — ESCITALOPRAM OXALATE 10 MG: 10 TABLET ORAL at 07:54

## 2020-09-13 RX ADMIN — ASPIRIN 81 MG 81 MG: 81 TABLET ORAL at 07:55

## 2020-09-13 RX ADMIN — TRAZODONE HYDROCHLORIDE 100 MG: 100 TABLET ORAL at 20:40

## 2020-09-13 RX ADMIN — ACETAMINOPHEN 650 MG: 325 TABLET ORAL at 15:34

## 2020-09-13 RX ADMIN — PALIPERIDONE 3 MG: 3 TABLET, EXTENDED RELEASE ORAL at 20:40

## 2020-09-13 ASSESSMENT — PAIN SCALES - GENERAL
PAINLEVEL_OUTOF10: 7
PAINLEVEL_OUTOF10: 5
PAINLEVEL_OUTOF10: 0
PAINLEVEL_OUTOF10: 0

## 2020-09-13 NOTE — PROGRESS NOTES
Patient was in dayroom watching TV until bedtime. Patient was medication compliant. Social with peers. Patient did request and use restroom x4 during the night. No signs of distress. Continue to monitor for safety.

## 2020-09-13 NOTE — PROGRESS NOTES
Psychiatric Progress Note    Kristen Mg  [id number]  09/13/20    CHIEF COMPLAINT: I am depressed    HPI: Kristen Mg an 77 yo  female that presents depression, suicidal gestures and aggressive behavior.   She has multiple outburst and is gone through multiple caregivers over the last few months and there fore was placed in [facility] [address]. While there patient has become more aggressive causing fear in staff. She has been reporting suicidal thoughts and having behavioral issues. Patient admitted to [facility]. Patient has history of being on SBU this year in 3/2020. Patient POA, daughter, provided voluntary consent for admission. Note initial documentation stated patient was threatening self with knife. This is now known to not be true. Pt remains in agreement with treatment plan. Pt Continues to deny and side-effects to current medications. Pt was polite and cordial during the interview process. Has been taking medications and has been compliant with treatment. Tolerating medications without side effect complaints. Pt noted she is doing \"fine. \" She was smiling and more verbal; bright and reactive. Attention and focus improved. Pt noted she is slept \"really well. \" Patient has been taking her medications with no issues. Pt noted her appetite is good. Pt denies depression and anxiety. Pt denied any thoughts to harm herself or anyone else. Pt denied any auditory or visual hallucinations. Phone call to Liya Silvestre at [facility] on Friday. Updated her on patient progress. Discussed patient's improvement with delusions and paranoia. Patient engaging with staff and attending groups. Patient pleasant and cooperative. DON aware patient plan is to return to facility on Monday. Discussed patient obtaining Paliperidone OLIVER with patient's daughter who is in agreement. Also discussed PT/OT evaluation which will determine LOC at [facility]. Plan:  Patient had Invega injection.  Second injection scheduled for Monday 9/14/2020. Will continue oral paliperidone 3 mg for 14 days and then stop.     No TD noted  AIMS=0  Met with pt and treatment team.    Allergies   Allergen Reactions    Penicillins Anaphylaxis     Per family      Sulfa Antibiotics Anaphylaxis     Per family    Sulfacetamide Sodium        Medications Prior to Admission: traZODone (DESYREL) 100 MG tablet, Take 1 tablet by mouth nightly  aspirin 81 MG EC tablet, Take 1 tablet by mouth daily  divalproex (DEPAKOTE ER) 500 MG extended release tablet, Take 1 tablet by mouth daily  ranitidine (ZANTAC) 150 MG tablet, Take 1 tablet by mouth 2 times daily  levothyroxine (SYNTHROID) 100 MCG tablet, Take 1 tablet by mouth daily  citalopram (CELEXA) 20 MG tablet, Take 1 tablet by mouth daily    Past Medical History:   Diagnosis Date    Allergic rhinitis     Contact dermatitis     hands    Contact dermatitis     b/l hands    CVA (cerebral vascular accident) ([address] 07/04/2019    MCA Left parietal & temporal lobes, lentiform nucleus & perventricular white matter    Depression     Diastolic dysfunction     grade 1    Hypothyroidism     Pure hypercholesterolemia     Right hemiparesis ([address] 07/04/2019    Senile dementia with behavioral disturbance ([address]         Patient Active Problem List   Diagnosis    Hypothyroidism    Pure hypercholesterolemia    Cerebrovascular accident (CVA) due to thrombosis of precerebral artery ([address]    Expressive aphasia    Right arm weakness    Right leg weakness    Major depressive disorder, severe ([address]    Severe recurrent major depression without psychotic features ([address]    Acute cystitis without hematuria    Major neurocognitive disorder possibly due to vascular disease, with behavioral disturbance ([address]       Review of Systems    OBJECTIVE  Vital Signs:  Vitals:    09/13/20 0800   BP: 132/75   Pulse: 91   Resp: 16   Temp: 97.3 °F (36.3 °C)   SpO2: 99%       Labs:  No results found for this or any previous visit (from the past 48 hour(s)). PSYCHIATRIC ASSESSMENT / MENTAL STATUS EXAM:   Vitals: Blood pressure 132/75, pulse 91, temperature 97.3 °F (36.3 °C), temperature source Temporal, resp. rate 16, height 5' 2\" (1.575 m), weight 130 lb (59 kg), SpO2 99 %. CONSTITUTIONAL:    Appearance: Appears stated age. Alert and oriented to person only. No acute distress. Adequate grooming and hygiene. Good eye contact. No prominent physical abnormalities. Attitude: Manner is cooperative and pleasant  Motor: No psychomotor agitation, retardation or abnormal movements noted  Speech: Clearly articulated; normal rate, volume, tone & amount. Language: intact understanding and production  Mood: improved - happy  Affect: mood congruent  Thought Production: Spontaneous. Thought Form: Coherent, linear, logical & goal-directed. No tangentiality or circumstantiality. No flight of ideas or loosening of associations. Thought Content/Perceptions: No BIB, no AVH, no delusion  Insight: limited  Judgment: limited  Memory: Immediate, recent, and remote appear intact, though not formally tested. Attention: maintained throughout interview  Fund of knowledge: Average  Gait/Balance: WC with two assist    IMPRESSION:    Neurocognitive D/O  With agitation and aggression  MDD severe recurrent       PLAN:  Psychiatric management:medication initiation and titration, group and individual therapy, safe and therapeutic environment. Status of problem/condition: Improving  Medical co-morbidities: Management per [facility] group, appreciate assistance  Legal Status: Pending  Disposition:estimated LOS: Pending. The treatment team reviewed with the patient the diagnosis and treatment recommendations to include the risks, benefits, and side effects of chosen medications. The patient verbalized understanding and agreed with the treatment regimen as outlined above. The patient was encouraged to participate in groups.   Medical records, Labs, Diagnotic tests reviewed  q15 min safety checks for safety  Interval History  Review current labs  Continue current medications  Supportive Therapy Provided  Pt had an opportunity to ask questions and address concerns  Pt encouraged to continue therapy group or individual.  Pt was in agreement with treatment plan. The risks benefits and side effects of medications were discussed with the patient, including alternatives and no treatment.     Electronically signed by KALIN Amin CNP on 9/13/2020 at 2:44 PM

## 2020-09-14 VITALS
OXYGEN SATURATION: 97 % | WEIGHT: 130 LBS | TEMPERATURE: 97.4 F | HEART RATE: 97 BPM | RESPIRATION RATE: 16 BRPM | BODY MASS INDEX: 23.92 KG/M2 | HEIGHT: 62 IN | SYSTOLIC BLOOD PRESSURE: 116 MMHG | DIASTOLIC BLOOD PRESSURE: 66 MMHG

## 2020-09-14 LAB
SARS-COV-2, NAAT: NOT DETECTED
SOURCE: NORMAL

## 2020-09-14 PROCEDURE — U0002 COVID-19 LAB TEST NON-CDC: HCPCS

## 2020-09-14 PROCEDURE — C9803 HOPD COVID-19 SPEC COLLECT: HCPCS

## 2020-09-14 PROCEDURE — 6370000000 HC RX 637 (ALT 250 FOR IP): Performed by: PSYCHIATRY & NEUROLOGY

## 2020-09-14 PROCEDURE — 6370000000 HC RX 637 (ALT 250 FOR IP): Performed by: NURSE PRACTITIONER

## 2020-09-14 PROCEDURE — 99239 HOSP IP/OBS DSCHRG MGMT >30: CPT | Performed by: NURSE PRACTITIONER

## 2020-09-14 RX ORDER — PALIPERIDONE 3 MG/1
3 TABLET, EXTENDED RELEASE ORAL NIGHTLY
Qty: 9 TABLET | Refills: 0 | Status: SHIPPED | OUTPATIENT
Start: 2020-09-14

## 2020-09-14 RX ORDER — DIVALPROEX SODIUM 125 MG/1
500 CAPSULE, COATED PELLETS ORAL NIGHTLY
Qty: 60 CAPSULE | Refills: 3 | Status: SHIPPED | OUTPATIENT
Start: 2020-09-14

## 2020-09-14 RX ORDER — ATORVASTATIN CALCIUM 40 MG/1
40 TABLET, FILM COATED ORAL NIGHTLY
Qty: 30 TABLET | Refills: 3 | Status: SHIPPED | OUTPATIENT
Start: 2020-09-14

## 2020-09-14 RX ORDER — ESCITALOPRAM OXALATE 10 MG/1
10 TABLET ORAL DAILY
Qty: 30 TABLET | Refills: 1 | Status: SHIPPED | OUTPATIENT
Start: 2020-09-15

## 2020-09-14 RX ADMIN — ACETAMINOPHEN 325 MG: 325 TABLET ORAL at 04:41

## 2020-09-14 RX ADMIN — LEVOTHYROXINE SODIUM 100 MCG: 100 TABLET ORAL at 07:40

## 2020-09-14 RX ADMIN — PANTOPRAZOLE SODIUM 40 MG: 40 TABLET, DELAYED RELEASE ORAL at 07:40

## 2020-09-14 RX ADMIN — ESCITALOPRAM OXALATE 10 MG: 10 TABLET ORAL at 07:40

## 2020-09-14 RX ADMIN — ASPIRIN 81 MG 81 MG: 81 TABLET ORAL at 07:40

## 2020-09-14 ASSESSMENT — PAIN SCALES - GENERAL
PAINLEVEL_OUTOF10: 0
PAINLEVEL_OUTOF10: 3

## 2020-09-14 NOTE — GROUP NOTE
Group Therapy Note    Date: 9/14/2020    Group Start Time: 0830  Group End Time: 0900    Number of Participants: 3/4    Type: Morning Goals Group/ Community Meeting    Group Topic/Objective: Set Goal For The Day and to review Unit Rules and Regulations. Patient's Goal:  When asked to state a goal for the day pt repeatedly stated \"I don't know. \" and did not agree with any of therapist suggestions. Notes:  Pt presented as pleasant and cooperative during group. Pt states she is feeling \"good\" and reports feeling grateful for family. Pt also reports restful sleep, but unsure how long she slept. Depression (0-10): 0    Anxiety (0-10): 0    Irritability/Aggitation (0-10): 0    Status After Intervention:  Improved    Participation Level:  Active Listener and Interactive    Participation Quality: Appropriate, Attentive and Sharing    Speech:  normal    Thought Process/Content: Logical    Affective Functioning: Congruent    Mood: Bright    Level of consciousness:  Alert and Attentive    Response to Learning: Able to verbalize current knowledge/experience    Endings: None Reported    Modes of Intervention: Education, Support and Socialization    Discipline Responsible: Certified Therapeutic Recreation Specialist     Electronically signed by Danni Mckeon MA on 9/14/2020 at 9:46 AM

## 2020-09-14 NOTE — DISCHARGE INSTR - COC
Continuity of Care Form    Patient Name: Franca Pate   :  [birth year]  MRN:  [id number]    Admit date:  2020  Discharge date: 20    Code Status Order: Full Code   Advance Directives:   [facility] Documentation       Date/Time Healthcare Directive Type of Healthcare Directive Copy in [facility] [address] Agent's Name Healthcare Agent's Phone Number    20 1110  Yes, patient has an advance directive for healthcare treatment  Durable power of  for health care  Yes, copy in chart  Adult Children -- --    20 0234  Unknown, patient unable to respond due to medical condition  --  --  -- -- --            Admitting Physician:  Analilia Charlton DO  PCP: Margarita Collado MD    Discharging Nurse: Paula Lloyd 23 Unit/Room#: 1050/1050-01  Discharging Unit Phone Number: [phone]    Emergency Contact:   Extended Emergency Contact Information  Primary Emergency Contact: Hugh White  Home Phone: [phone]  Relation: Child  Secondary Emergency Contact: gina flower  Mobile Phone: [phone]  Relation: Other    Past Surgical History:  Past Surgical History:   Procedure Laterality Date    APPENDECTOMY      age 15   [facility] APPENDECTOMY      COLONOSCOPY      TONSILLECTOMY AND ADENOIDECTOMY      age 6   [address]         Immunization History: There is no immunization history on file for this patient.     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, with behavioral disturbance ([facility]) F01.51       Isolation/Infection:   Isolation            No Isolation          Patient Infection Status       Infection Onset Added Last Indicated Last Indicated By Review Planned Expiration Resolved Resolved By    COVID-19 Rule Out 09/11/20 09/11/20 09/12/20 Covid-19 Ambulatory (Ordered) 09/20/20 09/26/20      Resolved    COVID-19 Rule Out 09/04/20 09/04/20 09/04/20 COVID-19 (Ordered)   09/04/20 Rule-Out Test Resulted            Nurse Assessment:  Last Vital Signs: /66   Pulse 97   Temp 97.4 °F (36.3 °C) (Temporal)   Resp 16   Ht 5' 2\" (1.575 m)   Wt 130 lb (59 kg)   SpO2 97%   BMI 23.78 kg/m²     Last documented pain score (0-10 scale): Pain Level: 0  Last Weight:   Wt Readings from Last 1 Encounters:   09/11/20 130 lb (59 kg)     Mental Status:  oriented and alert    IV Access:  - None    Nursing Mobility/ADLs:  Walking   Dependent  Transfer  Assisted  Bathing  Assisted  Dressing  Assisted  Toileting  Assisted  Feeding  Independent  Med Admin  Dependent  Med Delivery   crushed    Wound Care Documentation and Therapy:        Elimination:  Continence:   · Bowel: Yes  · Bladder: No  Urinary Catheter: None   Colostomy/Ileostomy/Ileal Conduit: No       Date of Last BM: 9/13/20    Intake/Output Summary (Last 24 hours) at 9/14/2020 1100  Last data filed at 9/14/2020 0750  Gross per 24 hour   Intake 480 ml   Output --   Net 480 ml     No intake/output data recorded. Safety Concerns: At Risk for Falls    Impairments/Disabilities:      Speech, Contractures - R hand/arm and Paralysis - R side    Nutrition Therapy:  Current Nutrition Therapy:   - Oral Diet:  General    Routes of Feeding: Oral  Liquids:  Thin Liquids  Daily Fluid Restriction: no  Last Modified Barium Swallow with Video (Video Swallowing Test): not done    Treatments at the Time of Hospital Discharge:   Respiratory Treatments: ***  Oxygen Therapy:  is not on home oxygen therapy. Ventilator:    - No ventilator support    Rehab Therapies: Physical Therapy and Occupational Therapy  Weight Bearing Status/Restrictions: No weight bearing restirctions  Other Medical Equipment (for information only, NOT a DME order):  wheelchair and bedside commode  Other Treatments: ***    Patient's personal belongings (please select all that are sent with patient):  Glasses    RN SIGNATURE:  Electronically signed by Snow Cisneros RN on 9/14/20 at 11:16 AM EDT    CASE MANAGEMENT/SOCIAL WORK SECTION    Inpatient Status Date: ***    Readmission Risk Assessment Score:  Readmission Risk              Risk of Unplanned Readmission:        15           Discharging to Facility/ Agency   · Name:   · Address:  · Phone:  · Fax:    Dialysis Facility (if applicable)   · Name:  · Address:  · Dialysis Schedule:  · Phone:  · Fax:    / signature: {Esignature:[id number]:::0}    PHYSICIAN SECTION    Prognosis: Good    Condition at Discharge: Stable    Rehab Potential (if transferring to Rehab): Good    Recommended Labs or Other Treatments After Discharge: per patient's pcp      Physician Certification: I certify the above information and transfer of Kristen Mg  is necessary for the continuing treatment of the diagnosis listed and that she requires Assisted Living for greater 30 days.      Update Admission H&P: No change in H&P    PHYSICIAN SIGNATURE:  Electronically signed by KALIN Arzate CNP on 9/14/20 at 11:01 AM EDT

## 2020-09-14 NOTE — PLAN OF CARE
Problem: Falls - Risk of:  Goal: Will remain free from falls  Description: Will remain free from falls  9/13/2020 2124 by Cristina Serrano RN  Outcome: Ongoing  9/13/2020 1511 by Mark Carey RN  Outcome: Ongoing  Goal: Absence of physical injury  Description: Absence of physical injury  9/13/2020 2124 by Cristina Serrano RN  Outcome: Ongoing  9/13/2020 1511 by Mark Carey RN  Outcome: Ongoing     Problem: Anger Management/Homicidal Ideation:  Goal: Able to display appropriate communication and problem solving  Description: Able to display appropriate communication and problem solving  9/13/2020 2124 by Cristina Serrano RN  Outcome: Ongoing  9/13/2020 1511 by Mark Carey RN  Outcome: Ongoing  Goal: Ability to verbalize frustrations and anger appropriately will improve  Description: Ability to verbalize frustrations and anger appropriately will improve  9/13/2020 2124 by Cristina Serrano RN  Outcome: Ongoing  9/13/2020 1511 by Mark Carey RN  Outcome: Ongoing  Goal: Absence of angry outbursts  Description: Absence of angry outbursts  9/13/2020 2124 by Cristina Serrano RN  Outcome: Ongoing  9/13/2020 1511 by Mark Carey RN  Outcome: Ongoing  Goal: Participates in care planning  Description: Participates in care planning  9/13/2020 2124 by Cristina Serrano RN  Outcome: Ongoing  9/13/2020 1511 by Mark Carey RN  Outcome: Ongoing     Problem: Skin Integrity:  Goal: Will show no infection signs and symptoms  Description: Will show no infection signs and symptoms  9/13/2020 2124 by Cristina Serrano RN  Outcome: Ongoing  9/13/2020 1511 by Mark Carey RN  Outcome: Ongoing  Goal: Absence of new skin breakdown  Description: Absence of new skin breakdown  9/13/2020 2124 by Cristina Serrano RN  Outcome: Ongoing  9/13/2020 1511 by Mark Carey RN  Outcome: Ongoing

## 2020-09-14 NOTE — CARE COORDINATION
LSW called [facility] with Umang Avendano ([phone]) to discuss discharge plan. [facility] will be able to accept patient back after 2 PM.  LSW also discussed POA paperwork, if patient would be quarantined, and family's desire to bring cupcakes in for her birthday this week. Nursing updated and patient's nurse Junie Sarabia, RN called daughter Ben Turk to update. Med list faxed to Umang Avendano at [phone]. Once COVID results are available, they are to be faxed with the discharge summary. 11:40 AM  Discharge summary faxed to Umang Avendano. LSW still awaits negative COVID result. LSW also informed by Junie Sarabia that patient's daughter Ben Turk will be here to pick her up around 2 PM.  Umang Avendano updated. Nursing:  Call report to [phone].    1:10 PM  COVID result still not back. Junie Sarabia informed LSW that discharge has been moved to 3 PM.  If results are still not present by 1430, a rapid test will be done for patient discharge. Umang Avendano updated. 3:30 PM  Negative COVID result faxed to Umang Avendano.

## 2020-09-14 NOTE — DISCHARGE SUMMARY
Department of Psychiatry    Discharge Summary    Almaz Chavez  [id number]    Admission date:   9/4/2020    Discharge:   Date: 09/14/20  Location: [facility]    Inpatient Provider: Chadwick Miller D.O. 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 Patient was able to provide informed consent and outline the risks and benefits of medications. Almaz Chavez was mostly compliant with medications. Pt noted a significant reduction in her depression and anxiety during her  stay on [facility]. Pt noted that she slowly improved to the point that she   was comfortable and safe to return home. Pt noted she felt her medications were  working well and denied any current side effects. Treatment team encouraged  patient to stay out of bed and try to find activities to do, verbalized  understanding. Patient reported that she felt safe on the unit and comfortable  for discharge. Pt denied suicidal/homicidal ideations, denied any problems  or concerns with medications or side effects. patient voiced progression towards  treatment goals and was offered a copy of updated treatment plan completed  during visit today. Denied any immediate needs or concerns. Pt throughout her stay on SBU pt felt like her medications were working and  felt comfortable being discharged on these medications.   Pt was advised to take  all medications as prescribed, follow up with all scheduled appointments and  abstain from any alcohol or illicit substances. Pt was in agreement. Pt felt  safe and comfortable to be discharge and to follow up with outpt mental health. Pt was very optimistic   about her D/C and returning to her home. Pt stated that she   was doing \"good,\" today. Pt stated that she slept \"about 7 hours,\" last night. Pt stated that her appetite is \"beautiful. \"  Pt stated that she rates her depression a  \"0,\" on a scale of 0-10 with 10 being the worst and 0 being none. Pt stated  that she rates her anxiety an \"0/10,\" on the same scale. Pt denies any auditory  or visual hallucinations. Pt denied any thoughts to harm herself or anyone else. Pt felt safe and comfortable for D/C. The Pt was educated primarily by verbal means about her diagnoses and their  manifestations in her life. The option for treatment including group individual  therapy programming was offered to her and the use of medications with all their  potential risks, benefits, and side-effects were discussed with the pt at  length. Pt was given the opportunity to ask questions and she participated in the  treatment and planning process. Pt felt ready and eager to be discharged from  the from the [facility] unit. Pt felt she was safe for this disposition. Pt was considered to be able to  participate in informed consent and decision-making with respect to medical,  legal and financial issues at the time of her discharge from the [facility]. Complications: none        Treatment options, medications and alternatives reviewed with Christiano Serna and they agree with the plan to discharge. Discharge on regular diet, continue activity as tolerated. Patient appears to be in stable condition and close to their baseline functioning. The patient denies suicidal or homicidal ideations and is showing future orientation. Patient no longer presented an imminent risk of danger to themselves and/or others.  At the time of discharge it appears that the patient has received the maximum medical benefit from this hospitalization and can be appropriately managed with community treatment.            Medication List      START taking these medications    atorvastatin 40 MG tablet  Commonly known as:  LIPITOR  Take 1 tablet by mouth nightly     divalproex 125 MG capsule  Commonly known as:  DEPAKOTE SPRINKLE  Take 4 capsules by mouth nightly  Replaces:  divalproex 500 MG extended release tablet     escitalopram 10 MG tablet  Commonly known as:  LEXAPRO  Take 1 tablet by mouth daily  Start taking on:  September 15, 2020     paliperidone 3 MG extended release tablet  Commonly known as:  INVEGA  Take 1 tablet by mouth nightly Take for 9 more nights, stopping after 9/22/2020     paliperidone palmitate  MG/1.5ML Josi IM injection  Commonly known as:  INVEGA SUSTENNA  Inject 234 mg into the muscle every 30 days  Start taking on:  October 9, 2020        CONTINUE taking these medications    aspirin 81 MG EC tablet  Take 1 tablet by mouth daily     levothyroxine 100 MCG tablet  Commonly known as:  SYNTHROID  Take 1 tablet by mouth daily     raNITIdine 150 MG tablet  Commonly known as:  ZANTAC  Take 1 tablet by mouth 2 times daily     traZODone 100 MG tablet  Commonly known as:  DESYREL  Take 1 tablet by mouth nightly        STOP taking these medications    citalopram 20 MG tablet  Commonly known as:  CELEXA     divalproex 500 MG extended release tablet  Commonly known as:  DEPAKOTE ER  Replaced by:  divalproex 125 MG capsule           Where to Get Your Medications      These medications were sent to [facility]/pharmacy #[id number]- [place], [place] - [address] [phone] - F [phone]  [address] [place] [id number]    Phone:  [phone]   · atorvastatin 40 MG tablet  · divalproex 125 MG capsule  · escitalopram 10 MG tablet  · paliperidone 3 MG extended release tablet  · paliperidone palmitate  MG/1.5ML Josi IM injection         Social History     Socioeconomic History    Marital status:      Spouse name: Not on file    Number of children: 2    Years of education: 15    Highest education level: Some college, no degree   Occupational History    Not on file   Social Needs    Financial resource strain: Not on file    Food insecurity     Worry: Not on file     Inability: Not on file   [employer] needs     Medical: Not on file     Non-medical: Not on file   Tobacco Use    Smoking status: Never Smoker    Smokeless tobacco: Never Used   Substance and Sexual Activity    Alcohol use: No    Drug use: No    Sexual activity: Not on file     Comment: .    Lifestyle    Physical activity     Days per week: Not on file     Minutes per session: Not on file    Stress: Not on file   Relationships    Social connections     Talks on phone: Not on file     Gets together: Not on file     Attends Judaism service: Not on file     Active member of club or organization: Not on file     Attends meetings of clubs or organizations: Not on file     Relationship status: Not on file    Intimate partner violence     Fear of current or ex partner: Not on file     Emotionally abused: Not on file     Physically abused: Not on file     Forced sexual activity: Not on file   Other Topics Concern    Not on file   Social History Narrative    ** Merged History Encounter **            Past Medical History:   Diagnosis Date    Allergic rhinitis     Contact dermatitis     hands    Contact dermatitis     b/l hands    CVA (cerebral vascular accident) ([facility] 07/04/2019    MCA Left parietal & temporal lobes, lentiform nucleus & perventricular white matter    Depression     Diastolic dysfunction     grade 1    Hypothyroidism     Pure hypercholesterolemia     Right hemiparesis ([facility] [facility] 07/04/2019    Senile dementia with behavioral disturbance ([facility] [facility]       Past Surgical History:   Procedure Laterality Date    APPENDECTOMY      age 15   [facility] APPENDECTOMY      COLONOSCOPY     [address] [facility]  ADENOIDECTOMY      age 6   [facility] TONSILLECTOMY AND ADENOIDECTOMY        Social History     Tobacco Use    Smoking status: Never Smoker    Smokeless tobacco: Never Used   Substance Use Topics    Alcohol use: No      Family History   Problem Relation Age of Onset    Diabetes Father     Stroke Father     Diabetes Brother     Stroke Maternal Grandmother     Heart Disease Maternal Grandfather     Kidney Disease Mother         Medications Prior to Admission: traZODone (DESYREL) 100 MG tablet, Take 1 tablet by mouth nightly  aspirin 81 MG EC tablet, Take 1 tablet by mouth daily  [DISCONTINUED] divalproex (DEPAKOTE ER) 500 MG extended release tablet, Take 1 tablet by mouth daily  ranitidine (ZANTAC) 150 MG tablet, Take 1 tablet by mouth 2 times daily  levothyroxine (SYNTHROID) 100 MCG tablet, Take 1 tablet by mouth daily  [DISCONTINUED] citalopram (CELEXA) 20 MG tablet, Take 1 tablet by mouth daily  Allergies   Allergen Reactions    Penicillins Anaphylaxis     Per family      Sulfa Antibiotics Anaphylaxis     Per family    Sulfacetamide Sodium         he patient verbalized understanding and agreement with the above information  LEGAL STATUS ON DISCHARGE:  Voluntary    DISCHARGE DISPOSITION:  [facility]    DISCHARGE CONDITION:  Stable for outpatient treatment  DISCHARGE DIET:  Resume previous home diet    ACTI VITES:  As tolerated    FOLLOWUP APPOINTMENT(S):  Per aftercare summary    CONSULTS:  [address] Ernie Momani Directive POA was offered and reviewed with pt:      Talked to pts poa,went over patients benefits and other matters. Reveiwed financial options /programs ect. .. CORE MEASURES  -Was the patient discharged on two or more Antipsychotics? No    -If multiple Antipsychotic medications prescribed,is tapering recommended? na    ?  If yes, document plan:  ?  -If multiple Antipsychotic medications prescribed at discharge, is cross tapering in process at D/C?ns    -Have there been 3 or more failed attempts of mono therapy? ?no    -If yes, list at least 3 of the failed Antipsychotic medications with the reason why each one failed: ?NA    -Was Hemoglobin A1C or fasting Glucose measure done within the last 12 months? Yes    -Lipid Panel measure done within the last 12 months?yes    -Pt was offered an FDA approved medication for Chemical Dependency: (offered refused/ordered)na    -Pt was offered for discharged on tobacco/nicotine cessation and/or codependency cessation via medication assistance: (offered refused/ordered)na      More than 30 mins was spent face to face with the patient to discuss the diagnosis, treatment recommendations, and prognosis. Safety planning was also reviewed. The patient agreed to go to the nearest ER or call 911 if they experienced an emergency        The patient was admitted to the psychiatric unit and monitored for stabilization. A multidisciplinary team met with the patient on a daily basis. The diagnosis, treatment recommendations, and prognosis were reviewed with the patient.       Objective:  Vital signs in last 24 hours:  Vitals:    09/14/20 0745   BP: 116/66   Pulse: 97   Resp: 16   Temp: 97.4 °F (36.3 °C)   SpO2: 97%       Labs:      Recent Results (from the past 504 hour(s))   CBC Auto Differential    Collection Time: 09/04/20  4:15 PM   Result Value Ref Range    WBC 7.2 4.0 - 10.5 K/CU MM    RBC 3.95 (L) 4.2 - 5.4 M/CU MM    Hemoglobin 12.5 12.5 - 16.0 GM/DL    Hematocrit 39.1 37 - 47 %    MCV 99.0 78 - 100 FL    MCH 31.6 (H) 27 - 31 PG    MCHC 32.0 32.0 - 36.0 %    RDW 13.0 11.7 - 14.9 %    Platelets 432 288 - 305 K/CU MM    MPV 9.7 7.5 - 11.1 FL    Differential Type AUTOMATED DIFFERENTIAL     Segs Relative 59.9 36 - 66 %    Lymphocytes % 28.2 24 - 44 %    Monocytes % 9.2 (H) 0 - 4 %    Eosinophils % 1.5 0 - 3 %    Basophils % 1.1 (H) 0 - 1 %    Segs Absolute 4.3 K/CU MM    Lymphocytes Absolute 2.0 K/CU MM    Monocytes Absolute 0.7 K/CU MM    Eosinophils Absolute 0.1 K/CU MM Basophils Absolute 0.1 K/CU MM    TRC 0.0766 K/CU MM    Immature Neutrophil % 0.1 0 - 0.43 %    Total Immature Neutrophil 0.01 K/CU MM   Comprehensive Metabolic Panel    Collection Time: 09/04/20  4:15 PM   Result Value Ref Range    Sodium 137 135 - 145 MMOL/L    Potassium 3.8 3.5 - 5.1 MMOL/L    Chloride 100 99 - 110 mMol/L    CO2 32 21 - 32 MMOL/L    BUN 8 6 - 23 MG/DL    CREATININE 0.6 0.6 - 1.1 MG/DL    Glucose 101 (H) 70 - 99 MG/DL    Calcium 10.0 8.3 - 10.6 MG/DL    Alb 4.4 3.4 - 5.0 GM/DL    Total Protein 7.1 6.4 - 8.2 GM/DL    Total Bilirubin 0.4 0.0 - 1.0 MG/DL    ALT 6 (L) 10 - 40 U/L    AST 15 15 - 37 IU/L    Alkaline Phosphatase 56 40 - 129 IU/L    GFR Non-African American >60 >60 mL/min/1.73m2    GFR African American >60 >60 mL/min/1.73m2    Anion Gap 5 4 - 16   Urinalysis    Collection Time: 09/04/20  4:15 PM   Result Value Ref Range    Color, UA PALE YELLOW YELLOW    Clarity, UA SL HAZY CLEAR    Glucose, Urine NEGATIVE NEGATIVE MG/DL    Bilirubin Urine NEGATIVE NEGATIVE MG/DL    Ketones, Urine NEGATIVE NEGATIVE MG/DL    Specific Gravity, UA 1.015 1.001 - 1.035    Blood, Urine NEGATIVE NEGATIVE    pH, Urine 6.5 5.0 - 8.0    Protein, UA NEGATIVE NEGATIVE MG/DL    Urobilinogen, Urine 0.2 0.2 - 1.0 MG/DL    Nitrite Urine, Quantitative NEGATIVE NEGATIVE    Leukocyte Esterase, Urine NEGATIVE NEGATIVE    RBC, UA NEGATIVE 0 - 6 /HPF    WBC, UA NEGATIVE 0 - 5 /HPF    Epithelial Cells, UA 0 TO 2 /HPF    Cast Type NEGATIVE (A) NO CAST FORMS SEEN /HPF    Bacteria, UA FEW NEGATIVE /HPF    Crystal Type NEGATIVE NEGATIVE /HPF   Acetaminophen Level    Collection Time: 09/04/20  4:15 PM   Result Value Ref Range    Acetaminophen Level <5.0 (L) 15 - 30 ug/ml    DOSE AMOUNT DOSE AMT. GIVEN - UNKNOWN     DOSE TIME DOSE TIME GIVEN - UNKNOWN    Salicylate Level    Collection Time: 09/04/20  4:15 PM   Result Value Ref Range    Salicylate Lvl <1.7 (L) 15 - 30 MG/DL    DOSE AMOUNT DOSE AMT.  GIVEN - UNKNOWN     DOSE TIME DOSE TIME GIVEN - UNKNOWN    Urine Drug Screen    Collection Time: 09/04/20  4:15 PM   Result Value Ref Range    Cannabinoid Scrn, Ur NEGATIVE NEGATIVE    Amphetamines NEGATIVE NEGATIVE    Cocaine Metabolite NEGATIVE NEGATIVE    Benzodiazepine Screen, Urine NEGATIVE NEGATIVE    Barbiturate Screen, Ur NEGATIVE NEGATIVE    Opiates, Urine NEGATIVE NEGATIVE    Phencyclidine, Urine NEGATIVE NEGATIVE    Oxycodone NEGATIVE NEGATIVE   EKG 12 Lead    Collection Time: 09/04/20  4:49 PM   Result Value Ref Range    Ventricular Rate 66 BPM    Atrial Rate 66 BPM    P-R Interval 160 ms    QRS Duration 90 ms    Q-T Interval 406 ms    QTc Calculation (Bazett) 425 ms    P Axis 39 degrees    R Axis -8 degrees    T Axis 29 degrees    Diagnosis       Normal sinus rhythm  Normal ECG  No previous ECGs available  Confirmed by Ashlyn Crow ([id number]) on 9/5/2020 8:42:41 PM     COVID-19    Collection Time: 09/04/20  6:15 PM    Specimen: Nasopharyngeal Swab   Result Value Ref Range    Source NASOPHARYNGEAL SWAB     SARS-CoV-2, NAAT NOT DETECTED    Valproic acid level, total    Collection Time: 09/09/20  6:00 AM   Result Value Ref Range    Valproic Acid Lvl 72.5 50 - 100 UG/ML    DOSE AMOUNT DOSE AMT.  GIVEN - UNKNOWN     DOSE TIME DOSE TIME GIVEN - UNKNOWN        40 Minutes    Electronically signed by KALIN Foy CNP on 9/14/2020 at 11:16 AM

## 2020-09-14 NOTE — PROGRESS NOTES
Patient resting in bed at start of shift. Pt incontinent twice throughout shift. Bed change completed. Tolerated well. Medication compliant. Rested most of the night. Complained of left knee pain, medicated with tylenol. No further needs.

## 2020-09-15 LAB
SARS-COV-2: NOT DETECTED
SOURCE: NORMAL

## 2020-09-19 ENCOUNTER — APPOINTMENT (OUTPATIENT)
Dept: CT IMAGING | Age: 75
End: 2020-09-19
Payer: MEDICARE

## 2020-09-19 ENCOUNTER — HOSPITAL ENCOUNTER (EMERGENCY)
Age: 75
Discharge: HOME | End: 2020-09-19
Attending: EMERGENCY MEDICINE
Payer: MEDICARE

## 2020-09-19 VITALS
OXYGEN SATURATION: 95 % | SYSTOLIC BLOOD PRESSURE: 149 MMHG | RESPIRATION RATE: 18 BRPM | HEART RATE: 95 BPM | WEIGHT: 130 LBS | TEMPERATURE: 97.1 F | DIASTOLIC BLOOD PRESSURE: 67 MMHG | BODY MASS INDEX: 23.78 KG/M2

## 2020-09-19 PROCEDURE — 99284 EMERGENCY DEPT VISIT MOD MDM: CPT

## 2020-09-19 PROCEDURE — 70486 CT MAXILLOFACIAL W/O DYE: CPT

## 2020-09-19 PROCEDURE — 72125 CT NECK SPINE W/O DYE: CPT

## 2020-09-19 PROCEDURE — 70450 CT HEAD/BRAIN W/O DYE: CPT

## 2020-09-19 NOTE — ED NOTES
Spoke with the patient's daughter Rosaura Alfredo who gave verbal authorization to eval and treat the patient.  She also was made aware of what testing we are doing and is agreeable to the testing     Ursula Guillaume RN  09/19/20 [id number]

## 2020-09-19 NOTE — ED PROVIDER NOTES
The history is provided by the EMS personnel and the nursing home. Fall   Incident onset: unknown. Fall occurred: Staff believe she fell out of a wheel chair. She landed on a hard floor. Point of impact: Unknown but she did hit her nose and face. She was not ambulatory at the scene. There was no entrapment after the fall. There was no drug use involved in the accident. There was no alcohol use involved in the accident. Associated symptoms comments: Patient had Epistaxis which was controlled by EMS and direct pressure. Review of Systems   Unable to perform ROS: Dementia       Family History   Problem Relation Age of Onset    Diabetes Father     Stroke Father     Diabetes Brother     Stroke Maternal Grandmother     Heart Disease Maternal Grandfather     Kidney Disease Mother      Social History     Socioeconomic History    Marital status:      Spouse name: Not on file    Number of children: 2    Years of education: 15    Highest education level: Some college, no degree   Occupational History    Not on file   Social Needs    Financial resource strain: Not on file    Food insecurity     Worry: Not on file     Inability: Not on file   [employer] needs     Medical: Not on file     Non-medical: Not on file   Tobacco Use    Smoking status: Never Smoker    Smokeless tobacco: Never Used   Substance and Sexual Activity    Alcohol use: No    Drug use: No    Sexual activity: Not on file     Comment: .    Lifestyle    Physical activity     Days per week: Not on file     Minutes per session: Not on file    Stress: Not on file   Relationships    Social connections     Talks on phone: Not on file     Gets together: Not on file     Attends Anabaptist service: Not on file     Active member of club or organization: Not on file     Attends meetings of clubs or organizations: Not on file     Relationship status: Not on file    Intimate partner violence     Fear of current or ex partner: Not on file     Emotionally abused: Not on file     Physically abused: Not on file     Forced sexual activity: Not on file   Other Topics Concern    Not on file   Social History Narrative    ** Merged History Encounter **          Past Surgical History:   Procedure Laterality Date    APPENDECTOMY      age 15   Danette Patricia APPENDECTOMY      COLONOSCOPY      TONSILLECTOMY AND ADENOIDECTOMY      age 6   Danette Patricia TONSILLECTOMY AND ADENOIDECTOMY       Past Medical History:   Diagnosis Date    Allergic rhinitis     Contact dermatitis     hands    Contact dermatitis     b/l hands    CVA (cerebral vascular accident) ([facility] [address] 07/04/2019    MCA Left parietal & temporal lobes, lentiform nucleus & perventricular white matter    Depression     Diastolic dysfunction     grade 1    Hypothyroidism     Pure hypercholesterolemia     Right hemiparesis ([facility] [address] 07/04/2019    Senile dementia with behavioral disturbance (HCC)      Allergies   Allergen Reactions    Penicillins Anaphylaxis     Per family      Sulfa Antibiotics Anaphylaxis     Per family    Sulfacetamide Sodium      Prior to Admission medications    Medication Sig Start Date End Date Taking?  Authorizing Provider   phenylephrine (POOL-SYNEPHRINE) 0.5 % nasal spray 1 spray by Nasal route every 4 hours as needed for Congestion   Yes Historical Provider, MD   divalproex (DEPAKOTE SPRINKLE) 125 MG capsule Take 4 capsules by mouth nightly 9/14/20   KALIN Palmer CNP   escitalopram (LEXAPRO) 10 MG tablet Take 1 tablet by mouth daily 9/15/20   KALIN Palmer CNP   atorvastatin (LIPITOR) 40 MG tablet Take 1 tablet by mouth nightly 9/14/20   KALIN Palmer CNP   paliperidone (INVEGA) 3 MG extended release tablet Take 1 tablet by mouth nightly Take for 9 more nights, stopping after 9/22/2020 9/14/20   KALIN Palmer CNP   paliperidone palmitate ER (INVEGA SUSTENNA) 234 MG/1.5ML GENOVEVA IM injection Inject 234 mg into the muscle every 30 days 10/9/20   Anamaria Dire JAQUELIN NairN - CNP   traZODone (DESYREL) 100 MG tablet Take 1 tablet by mouth nightly 4/17/20   Mauldinsara OrdonezKALIN - CNP   aspirin 81 MG EC tablet Take 1 tablet by mouth daily 4/4/20   El CouchKALIN - CNP   ranitidine (ZANTAC) 150 MG tablet Take 1 tablet by mouth 2 times daily 11/15/19   Dipesh Saavedra MD   levothyroxine (SYNTHROID) 100 MCG tablet Take 1 tablet by mouth daily 11/15/19   Dipesh Saavedra MD       BP (!) 149/67   Pulse 95   Temp 97.1 °F (36.2 °C) (Oral)   Resp 18   Wt 130 lb (59 kg)   SpO2 95%   BMI 23.78 kg/m²     Physical Exam  Vitals signs and nursing note reviewed. HENT:      Head:        Comments: Blue represents areas of tenderness and ecchymosis     Nose: Nasal tenderness present. No nasal deformity or septal deviation. Right Nostril: Epistaxis present. Left Nostril: Epistaxis present. Comments: Blood clots were evacuated from each nares, nasal mucosa is inflamed no active bleeding can be appreciated No  septal hematoma  Neck:      Musculoskeletal: Muscular tenderness present. Comments: Kyphosis is noted  Cardiovascular:      Rate and Rhythm: Normal rate. Pulses: Normal pulses. Heart sounds: Murmur present. Pulmonary:      Effort: Pulmonary effort is normal. No respiratory distress. Abdominal:      General: Abdomen is flat. There is no distension. Tenderness: There is no abdominal tenderness. Musculoskeletal:         General: No deformity or signs of injury. Skin:     General: Skin is warm. Neurological:      General: No focal deficit present. Mental Status: She is alert. Mental status is at baseline. MDM:    No results found for this visit on 09/19/20.         My typical dicussion, presentation,and considerations for this patients' chief complaint, diagnosis, differential diagnosis, medications, medication use,  medication safety and medication interactions have been explained and outlined to this patient for

## 2020-09-21 ENCOUNTER — TELEPHONE (OUTPATIENT)
Dept: FAMILY MEDICINE CLINIC | Age: 75
End: 2020-09-21

## 2020-09-21 NOTE — LETTER
September 21, 2020    [id number] [address]  Daughter Address To [facility] [address]    Dear Ivy Fontana,    This letter is regarding your Emergency Department (ED) visit at [facility] on 9/19/20. Dr.Ronald DADA Faye wanted to make sure that you understand your discharge instructions and that you were able to fill any prescriptions that may have been ordered for you. Please contact the office at the above phone number if the ED advised you to follow up with Syed Smith, or if you have any further questions or needs. Also did you know -   *Visiting the ED for a non-emergency could result in higher co-pays than you would normally be subject to paying? [facility]) practices can often offer you an appointment on the same day that you call for acute issues. *We have some [facility] offices that offer Walk-in appointments; check our website for availability in your community, [email or web address].      *Evisits are now available for patients for through [address] If you do not have MyChart and are interested, please contact the office and a staff member may assist you or go to [email or web address].     Sincerely,   Adriana Mata MD and your [facility]

## 2020-10-20 LAB
EKG ATRIAL RATE: 66 BPM
EKG DIAGNOSIS: NORMAL
EKG P AXIS: 39 DEGREES
EKG P-R INTERVAL: 160 MS
EKG Q-T INTERVAL: 406 MS
EKG QRS DURATION: 90 MS
EKG QTC CALCULATION (BAZETT): 425 MS
EKG R AXIS: -8 DEGREES
EKG T AXIS: 29 DEGREES
EKG VENTRICULAR RATE: 66 BPM
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
